# Patient Record
Sex: FEMALE | Race: WHITE | Employment: OTHER | URBAN - METROPOLITAN AREA
[De-identification: names, ages, dates, MRNs, and addresses within clinical notes are randomized per-mention and may not be internally consistent; named-entity substitution may affect disease eponyms.]

---

## 2018-10-03 ENCOUNTER — HOSPITAL ENCOUNTER (OUTPATIENT)
Dept: WOMENS IMAGING | Age: 58
Discharge: HOME | End: 2018-10-03
Payer: MEDICARE

## 2018-10-03 DIAGNOSIS — Z12.31 VISIT FOR SCREENING MAMMOGRAM: ICD-10-CM

## 2018-10-03 PROCEDURE — 77067 SCR MAMMO BI INCL CAD: CPT

## 2020-01-27 ENCOUNTER — HOSPITAL ENCOUNTER (OUTPATIENT)
Dept: WOMENS IMAGING | Age: 60
Discharge: HOME | End: 2020-01-27
Payer: COMMERCIAL

## 2020-01-27 PROCEDURE — 77067 SCR MAMMO BI INCL CAD: CPT

## 2021-03-16 ENCOUNTER — IMMUNIZATION (OUTPATIENT)
Dept: PRIMARY CARE CLINIC | Age: 61
End: 2021-03-16
Payer: MEDICARE

## 2021-03-16 PROCEDURE — 91301 COVID-19, MODERNA VACCINE 100MCG/0.5ML DOSE: CPT | Performed by: FAMILY MEDICINE

## 2021-03-16 PROCEDURE — 0011A PR IMM ADMN SARSCOV2 100 MCG/0.5 ML 1ST DOSE: CPT | Performed by: FAMILY MEDICINE

## 2021-08-19 ENCOUNTER — OFFICE VISIT (OUTPATIENT)
Dept: ORTHOPEDIC SURGERY | Age: 61
End: 2021-08-19
Payer: MEDICARE

## 2021-08-19 VITALS — HEIGHT: 58 IN | WEIGHT: 144 LBS | BODY MASS INDEX: 30.23 KG/M2

## 2021-08-19 DIAGNOSIS — M25.562 ACUTE PAIN OF BOTH KNEES: Primary | ICD-10-CM

## 2021-08-19 DIAGNOSIS — M65.9 SYNOVITIS OF BOTH KNEE JOINTS: ICD-10-CM

## 2021-08-19 DIAGNOSIS — M22.41 CHONDROMALACIA OF BOTH PATELLAE: ICD-10-CM

## 2021-08-19 DIAGNOSIS — M22.42 CHONDROMALACIA OF BOTH PATELLAE: ICD-10-CM

## 2021-08-19 DIAGNOSIS — M25.561 ACUTE PAIN OF BOTH KNEES: Primary | ICD-10-CM

## 2021-08-19 PROCEDURE — 20610 DRAIN/INJ JOINT/BURSA W/O US: CPT | Performed by: FAMILY MEDICINE

## 2021-08-19 PROCEDURE — G8417 CALC BMI ABV UP PARAM F/U: HCPCS | Performed by: FAMILY MEDICINE

## 2021-08-19 PROCEDURE — 99203 OFFICE O/P NEW LOW 30 MIN: CPT | Performed by: FAMILY MEDICINE

## 2021-08-19 PROCEDURE — G8427 DOCREV CUR MEDS BY ELIG CLIN: HCPCS | Performed by: FAMILY MEDICINE

## 2021-08-19 RX ORDER — BUPIVACAINE HYDROCHLORIDE 2.5 MG/ML
4 INJECTION, SOLUTION INFILTRATION; PERINEURAL ONCE
Status: COMPLETED | OUTPATIENT
Start: 2021-08-19 | End: 2021-08-19

## 2021-08-19 RX ORDER — CLOBETASOL PROPIONATE 0.5 MG/G
OINTMENT TOPICAL 2 TIMES DAILY PRN
COMMUNITY

## 2021-08-19 RX ORDER — LEVOTHYROXINE SODIUM 0.05 MG/1
TABLET ORAL
COMMUNITY
Start: 2020-09-15

## 2021-08-19 RX ORDER — TRAZODONE HYDROCHLORIDE 50 MG/1
TABLET ORAL
COMMUNITY
Start: 2021-06-14 | End: 2022-09-20 | Stop reason: ALTCHOICE

## 2021-08-19 RX ORDER — BETAMETHASONE SODIUM PHOSPHATE AND BETAMETHASONE ACETATE 3; 3 MG/ML; MG/ML
24 INJECTION, SUSPENSION INTRA-ARTICULAR; INTRALESIONAL; INTRAMUSCULAR; SOFT TISSUE ONCE
Status: COMPLETED | OUTPATIENT
Start: 2021-08-19 | End: 2021-08-19

## 2021-08-19 RX ORDER — HYDROXYZINE HYDROCHLORIDE 10 MG/1
10 TABLET, FILM COATED ORAL EVERY 4 HOURS PRN
COMMUNITY

## 2021-08-19 RX ORDER — TRAMADOL HYDROCHLORIDE 50 MG/1
TABLET ORAL
COMMUNITY
Start: 2021-06-22 | End: 2021-09-17

## 2021-08-19 RX ORDER — MIRTAZAPINE 15 MG/1
TABLET, FILM COATED ORAL
COMMUNITY
Start: 2021-06-14 | End: 2022-09-20 | Stop reason: ALTCHOICE

## 2021-08-19 RX ORDER — LORAZEPAM 1 MG/1
1 TABLET ORAL EVERY 6 HOURS PRN
COMMUNITY

## 2021-08-19 RX ORDER — LIDOCAINE HYDROCHLORIDE 10 MG/ML
2 INJECTION, SOLUTION INFILTRATION; PERINEURAL ONCE
Status: COMPLETED | OUTPATIENT
Start: 2021-08-19 | End: 2021-08-19

## 2021-08-19 RX ORDER — TOPIRAMATE 100 MG/1
TABLET, FILM COATED ORAL
COMMUNITY
Start: 2021-06-14

## 2021-08-19 RX ORDER — BUPROPION HYDROCHLORIDE 300 MG/1
300 TABLET ORAL EVERY MORNING
COMMUNITY
Start: 2021-08-13

## 2021-08-19 RX ORDER — SUMATRIPTAN 50 MG/1
50 TABLET, FILM COATED ORAL
COMMUNITY
Start: 2021-05-24 | End: 2022-09-20 | Stop reason: ALTCHOICE

## 2021-08-19 RX ORDER — CELECOXIB 200 MG/1
200 CAPSULE ORAL DAILY
Qty: 30 CAPSULE | Refills: 3 | Status: SHIPPED | OUTPATIENT
Start: 2021-08-19 | End: 2022-09-20 | Stop reason: ALTCHOICE

## 2021-08-19 RX ORDER — MELOXICAM 15 MG/1
15 TABLET ORAL DAILY
COMMUNITY
Start: 2021-07-14 | End: 2022-09-20 | Stop reason: ALTCHOICE

## 2021-08-19 RX ADMIN — BUPIVACAINE HYDROCHLORIDE 10 MG: 2.5 INJECTION, SOLUTION INFILTRATION; PERINEURAL at 16:12

## 2021-08-19 RX ADMIN — BETAMETHASONE SODIUM PHOSPHATE AND BETAMETHASONE ACETATE 24 MG: 3; 3 INJECTION, SUSPENSION INTRA-ARTICULAR; INTRALESIONAL; INTRAMUSCULAR; SOFT TISSUE at 16:11

## 2021-08-19 RX ADMIN — LIDOCAINE HYDROCHLORIDE 2 ML: 10 INJECTION, SOLUTION INFILTRATION; PERINEURAL at 16:12

## 2021-08-19 NOTE — PROGRESS NOTES
Chief Complaint  Knee Pain (N KNEE)      Initial consultation ongoing right greater than left anterior knee pain with mild swelling and history of multiple joint arthralgias followed by Dr. Kole Faye in rheumatology    History of Present Illness:  Edu Pandey is a 62 y.o. female who is a very pleasant white female recreational walker who is an avid  and is a very nice patient of Dr. Chacorta Stacy who is being seen today in kind consultation from Dr. Chacorta Stacy for evaluation of ongoing pain to the anterior portion of her knees bilaterally. She states that since summer 2020, she began to notice increasing pain with crepitation and popping to the anterior portion of her knees. There is no history of actual injury or new activity prior to becoming symptomatic and she is having pain ranging between a 2-5 out of 10 involving the knee. The right primarily is worse than the left but is not really associated with pseudobuckling and she has not noticed locking or catching or popping. At rest she will have a dull achy pain at 2 out of 10 but will have sharp for 4-5 out of 10 pain so she is squatting kneeling such as gardening and with reading and does have pain with positional changes and stairs. She was recently switched off Celebrex by Dr. Kole Faye to try meloxicam but believes the Celebrex was working much better for her. She is being seen today for orthopedic and sports consultation with initial weightbearing imaging.       Pain Assessment  Location of Pain: Knee  Location Modifiers: Left, Right  Severity of Pain: 5 (LEFT =5 RIGHT =3)  Quality of Pain: Sharp, Dull, Aching  Duration of Pain: Persistent  Frequency of Pain: Constant  Aggravating Factors: Standing, Walking, Other (Comment), Stairs (SIT TO STAND)  Limiting Behavior: Yes  Relieving Factors: Rest  Result of Injury: No  Work-Related Injury: No  Are there other pain locations you wish to document?: No         Medical History     Patient's medications, allergies, past medical, surgical, social and family histories were reviewed and updated as appropriate. Review of Systems  Pertinent items are noted in HPI  Review of systems reviewed from Patient History Form dated on 8/19/2021 and available in the patient's chart under the Media tab. Vital Signs  There were no vitals filed for this visit. General Exam:     Constitutional: Patient is adequately groomed with no evidence of malnutrition  DTRs: Deep tendon reflexes are intact  Mental Status: The patient is oriented to time, place and person. The patient's mood and affect are appropriate. Lymphatic: The lymphatic examination bilaterally reveals all areas to be without enlargement or induration. Vascular: Examination reveals no swelling or calf tenderness. Peripheral pulses are palpable and 2+. Neurological: The patient has good coordination. There is no weakness or sensory deficit. Knee Examination  Inspection: There is no high-grade deformity or malalignments. She is at best only trace knee joint effusions and does have some patellofemoral crepitation. Palpation: She.[email or web address] does have tenderness over the medial and lateral patellofemoral facet with patellar grind testing reproducing majority of her symptoms although she does have some very mild posterior medial joint line tenderness right greater than left. Rang of Motion: Full active and passive range of motion with mild tightness to hamstrings. Fair quad tone. Strength: Plus out of 5 with flexion extension. Special Tests: Positive patellar grind testing reproducing majority of her symptoms. No high-grade clicks and only mild worsening of pain with medial Lana's bilaterally. I do not sense high-grade instability and screening hip testing is benign. Skin: There are no rashes, ulcerations or lesions. Distal neurovascular exam is intact.     Gait: Fluid smooth gait    Reflex symmetrically preserved    Additional Comments: Additional Examinations:  Right Lower Extremity: Examination of the right lower extremity does not show any tenderness, deformity or injury. Range of motion is unremarkable. There is no gross instability. There are no rashes, ulcerations or lesions. Strength and tone are normal.  Left Lower Extremity: Examination of the left lower extremity does not show any tenderness, deformity or injury. Range of motion is unremarkable. There is no gross instability. There are no rashes, ulcerations or lesions. Strength and tone are normal.      Diagnostic Test Findings: Bilateral knee AP and PA weightbearing sunrise and lateral films does show relatively good maintenance of articular surface heights with mild tilt on sunrise view without high-grade patellofemoral arthropathy      Assessment : #1.  1 year status post symptomatic regular left knee symptomatic chondromalacia patella with knee synovitis and knee pain    Impression:  Encounter Diagnoses   Name Primary?  Acute pain of both knees Yes    Synovitis of both knee joints     Chondromalacia of both patellae        Office Procedures:  Orders Placed This Encounter   Procedures    XR KNEE RIGHT (MIN 4 VIEWS)     Standing Status:   Future     Number of Occurrences:   1     Standing Expiration Date:   8/19/2022     Order Specific Question:   Reason for exam:     Answer:   pain    XR KNEE LEFT (MIN 4 VIEWS)     Standing Status:   Future     Number of Occurrences:   1     Standing Expiration Date:   8/19/2022     Order Specific Question:   Reason for exam:     Answer:   pain    Ambulatory referral to Physical Therapy     Referral Priority:   Routine     Referral Type:   Eval and Treat     Referral Reason:   Specialty Services Required     Requested Specialty:   Physical Therapy     Number of Visits Requested:   1    20610 - [place] DRAIN/INJECT LARGE JOINT/BURSA       Treatment Plan:  Treatment options were discussed with Twyla Garcia.   We did review her plain films and exam findings. I suspect we are dealing primarily with unrehabilitated right greater than left knee chondromalacia patella with maltracking. She also sees rheumatology for multiple joint arthralgias. We will change her back to Celebrex 200 mg daily has the meloxicam which she has been on for the past month does not seem to be as effective as the Celebrex was previously. After discussing options, we did perform bilateral knee intra-articular steroid injections using 2 cc of Celestone, 2 cc of Marcaine, 1 cc of Xylocaine. I would like for her to attend physical therapy for VMO strengthening and hamstring flexibility. Potential for MRI imaging given her mild posterior medial knee pain more so on the right was discussed. We will hold off and see how she does with additional conservative treatment. Icing and activity modification also discussed. We will see her back in 4 weeks but she will contact us in the interim with questions or concerns. CC: Dr. Erin Porras      This dictation was performed with a verbal recognition program [facility]) and it was checked for errors. It is possible that there are still dictated errors within this office note. If so, please bring any errors to my attention for an addendum. All efforts were made to ensure that this office note is accurate.

## 2021-08-24 ENCOUNTER — HOSPITAL ENCOUNTER (OUTPATIENT)
Dept: PHYSICAL THERAPY | Age: 61
Setting detail: THERAPIES SERIES
Discharge: HOME | End: 2021-08-24
Payer: MEDICARE

## 2021-08-24 PROCEDURE — 97110 THERAPEUTIC EXERCISES: CPT | Performed by: PHYSICAL THERAPIST

## 2021-08-24 PROCEDURE — 97112 NEUROMUSCULAR REEDUCATION: CPT | Performed by: PHYSICAL THERAPIST

## 2021-08-24 PROCEDURE — 97161 PT EVAL LOW COMPLEX 20 MIN: CPT | Performed by: PHYSICAL THERAPIST

## 2021-08-24 NOTE — FLOWSHEET NOTE
Weight Shift     Ankle Pumps                    CKC     Calf raises     Wall sits     Step ups     1 leg stand 20 sec x 3 B  Added 8/24   Squatting     CC TKE     Balance     bridges          PRE     Extension  RANGE:   Flexion  RANGE:        Quantum machines     Leg press      Leg extension     Leg curl          Manual interventions                     Therapeutic Exercise and NMR EXR  [x] (99183) Provided verbal/tactile cueing for activities related to strengthening, flexibility, endurance, ROM for improvements in LE, proximal hip, and core control with self care, mobility, lifting, ambulation. [x] (00628) Provided verbal/tactile cueing for activities related to improving balance, coordination, kinesthetic sense, posture, motor skill, proprioception  to assist with LE, proximal hip, and core control in self care, mobility, lifting, ambulation and eccentric single leg control.      NMR and Therapeutic Activities:    [x] (38845 or 66773) Provided verbal/tactile cueing for activities related to improving balance, coordination, kinesthetic sense, posture, motor skill, proprioception and motor activation to allow for proper function of core, proximal hip and LE with self care and ADLs  [] (10895) Gait Re-education- Provided training and instruction to the patient for proper LE, core and proximal hip recruitment and positioning and eccentric body weight control with ambulation re-education including up and down stairs     Home Exercise Program:    [x] (30077) Reviewed/Progressed HEP activities related to strengthening, flexibility, endurance, ROM of core, proximal hip and LE for functional self-care, mobility, lifting and ambulation/stair navigation   [] (46735)Reviewed/Progressed HEP activities related to improving balance, coordination, kinesthetic sense, posture, motor skill, proprioception of core, proximal hip and LE for self care, mobility, lifting, and ambulation/stair navigation      Manual Treatments:  PROM / STM / Oscillations-Mobs:  G-I, II, III, IV (PA's, Inf., Post.)  [] (99189) Provided manual therapy to mobilize LE, proximal hip and/or LS spine soft tissue/joints for the purpose of modulating pain, promoting relaxation,  increasing ROM, reducing/eliminating soft tissue swelling/inflammation/restriction, improving soft tissue extensibility and allowing for proper ROM for normal function with self care, mobility, lifting and ambulation. Modalities:  Declined 8/24   [] GAME READY (VASO)- for significant edema, swelling, pain control. Charges:  Timed Code Treatment Minutes: 30'    Total Treatment Minutes: 54'       [x] EVAL (LOW) 455 1011 (typically 20 minutes face-to-face)  [] EVAL (MOD) 32635 (typically 30 minutes face-to-face)  [] EVAL (HIGH) 82172 (typically 45 minutes face-to-face)  [] RE-EVAL     [x] IK(76642) x 1    [] IONTO  [x] NMR (62886) x 1    [] VASO  [] Manual (51729) x     [] Other:  [] TA x      [] Mech Traction (41499)  [] ES(attended) (34566)      [] ES (un) (62965):       ASSESSMENT:  See eval 8/24    GOALS:  Patient stated goal: exercises for home to gain strength for knee  [] Progressing: [] Met: [] Not Met: [] Adjusted    Therapist goals for Patient:   Short Term Goals: To be achieved in: 2 weeks  1. Independent in HEP and progression per patient tolerance, in order to prevent re-injury. [] Progressing: [] Met: [] Not Met: [] Adjusted   2. Patient will have a decrease in pain to facilitate improvement in movement, function, and ADLs as indicated by Functional Deficits. [] Progressing: [] Met: [] Not Met: [] Adjusted    Long Term Goals: To be achieved in: 12 weeks  1. Disability index score of 26% or less for the [facility] to assist with reaching prior level of function. [] Progressing: [] Met: [] Not Met: [] Adjusted  2.  Patient will demonstrate increased bilateral knee flex AROM to greater than or equal to 130 deg to allow for proper joint functioning as indicated by patients Functional Deficits. [] Progressing: [] Met: [] Not Met: [] Adjusted  3. Patient will demonstrate an increase in bilateral hip (flex and ABD) and knee (flex and ext) strength to 4+/5 to allow for proper functional mobility as indicated by patients Functional Deficits. [] Progressing: [] Met: [] Not Met: [] Adjusted  4. Patient will return to sit to stands and ambulating stairs  without increased symptoms or restriction. [] Progressing: [] Met: [] Not Met: [] Adjusted  5. Patient will report gardening (if in season) pain free. [] Progressing: [] Met: [] Not Met: [] Adjusted     Overall Progression Towards Functional goals/ Treatment Progress Update:  [] Patient is progressing as expected towards functional goals listed. [] Progression is slowed due to complexities/Impairments listed. [] Progression has been slowed due to co-morbidities. [x] Plan just implemented, too soon to assess goals progression <30days   [] Goals require adjustment due to lack of progress  [] Patient is not progressing as expected and requires additional follow up with physician  [] Other    Prognosis for POC: [x] Good [] Fair  [] Poor      Patient requires continued skilled intervention: [x] Yes  [] No    Treatment/Activity Tolerance:  [x] Patient tolerated treatment well [] Patient limited by fatique  [] Patient limited by pain  [] Patient limited by other medical complications  [] Other: Pt is going to be out of town for about 8 weeks for the birth of her new grandchild. She will follow-up when she comes back into town. She was educated to email me with any questions/ concerns while she is out of town.  8/24    Return to Play: (if applicable)   []  Stage 1: Intro to Strength   []  Stage 2: Return to Run and Strength   []  Stage 3: Return to Jump and Strength   []  Stage 4: Dynamic Strength and Agility   []  Stage 5: Sport Specific Training     []  Ready to Return to Play, Meets All Above Stages   []  Not Ready for Return to Sports   Comments:            Patient education: Patient education on PT and plan of care including diagnosis, prognosis, treatment goals and options. Patient agrees with discussed POC and treatment and is aware of rehab process. 8/24      PLAN: See eval 8/24  [] Continue per plan of care [] Alter current plan (see comments above)  [x] Plan of care initiated [] Hold pending MD visit [] Discharge      Electronically signed by:  Ayad Soriano PT, DPT     Note: If patient does not return for scheduled/ recommended follow up visits, this note will serve as a discharge from care along with most recent update on progress.

## 2021-08-24 NOTE — PLAN OF CARE
of knee pain, but they started to get worse over the past year. She states she saw a rheumatologist, who referred her to an ortho MD for her knees. She states she is also seeing a chiropractor for her back right now, as well. She states the ortho MD, who gave her bilateral cortisone shots. She states they have really helped. She states the ortho MD also referred her to PT. CLOF: She states bending and going up/down steps increases the pain the worst. Denies pain with sleeping and driving. She states moving after prolonged sitting/ laying is difficult. She states her knees get \"caught\" and she has to walk funny to release.  She states left knee is worse than R knee     Relevant Medical History: RA (was tested and was negative, but re-testing to make sure it was not a false negative), OA, anxiety (controlled), depression (controlled), N&T (in hands), SOB (PCP is aware), vertigo (comes and goes), migraines (2x/ week, takes medication) smokes   Functional Disability Index:  WOMAC  8/24 - 52% limitation       Pain Scale: 0/10 today, 8/10 at worst  Easing factors: none  Provocative factors: listed above      Type: [x]Constant   []Intermittent  []Radiating []Localized []other:     Numbness/Tingling: none    Occupation/School: Retired    Hobbies: gardening, grandbabies    Living Status/Prior Level of Function: Independent with ADLs and IADLs    OBJECTIVE:      Flexibility L R Comment   Hamstring Lacking 33 deg Lacking 21 deg 90/90 position   ITB WFL WFL    Quad              ROM AROM    L R   Flexion 122 123   Extension 0 2 hyperext       Strength L R Comment   Quad 4-/5 4+/5    Hamstring 3+/5 4+/5          Hip  flexion 3+/5 + pain  4/5    Hip abd 4-/5 4/5    Hip ext                 Special Test Results/Comment   Meniscal Click Neg   Crepitus Pos   Valgus Laxity Neg   Varus Laxity Neg         Reflexes/Sensation:    [x]Dermatomes/Myotomes intact    []Reflexes equal and normal bilaterally   []Other:    Joint mobility: [x]Normal    []Hypo   []Hyper    Palpation: no TTP     Functional Mobility/Transfers: independent     Posture: WFL     Gait: (include devices/WB status) lacking L knee ext at initial contact, toe out on LLE                         [x] Patient history, allergies, meds reviewed. Medical chart reviewed. See intake form. Review Of Systems (ROS):  [x]Performed Review of systems (Integumentary, CardioPulmonary, Neurological) by intake and observation. Intake form has been scanned into medical record. Patient has been instructed to contact their primary care physician regarding ROS issues if not already being addressed at this time.       Co-morbidities/Complexities (which will affect course of rehabilitation):   []None           Arthritic conditions   []Rheumatoid arthritis (M05.9)  [x]Osteoarthritis (M19.91)   Cardiovascular conditions   []Hypertension (I10)  []Hyperlipidemia (E78.5)  []Angina pectoris (I20)  []Atherosclerosis (I70)   Musculoskeletal conditions   []Disc pathology   []Congenital spine pathologies   []Prior surgical intervention  []Osteoporosis (M81.8)  []Osteopenia (M85.8)   Endocrine conditions   []Hypothyroid (E03.9)  []Hyperthyroid Gastrointestinal conditions   []Constipation (H84.65)   Metabolic conditions   []Morbid obesity (E66.01)  []Diabetes type 1(E10.65) or 2 (E11.65)   []Neuropathy (G60.9)     Pulmonary conditions   []Asthma (J45)  []Coughing   []COPD (J44.9)   Psychological Disorders  [x]Anxiety (F41.9)  [x]Depression (F32.9)   []Other:   []Other:  Low back pain, present tobacco use        Barriers to/and or personal factors that will affect rehab potential:              []Age  []Sex              []Motivation/Lack of Motivation                        [x]Co-Morbidities              []Cognitive Function, education/learning barriers              []Environmental, home barriers              []profession/work barriers  [x]past PT/medical experience  []other:  Justification: Pt's co-morbidities and current c/o of back pain may affect rehab potential.     Falls Risk Assessment (30 days):   [x] Falls Risk assessed and no intervention required. [] Falls Risk assessed and Patient requires intervention due to being higher risk   TUG score (>12s at risk):     [] Falls education provided, including       ASSESSMENT:   Functional Impairments:     []Noted lumbar/proximal hip/LE hypomobility   [x]Decreased LE functional ROM   [x]Decreased core/proximal hip strength and neuromuscular control   [x]Decreased LE functional strength   []Reduced balance/proprioceptive control   []other:      Functional Activity Limitations (from functional questionnaire and intake)   [x]Reduced ability to tolerate prolonged functional positions   [x]Reduced ability or difficulty with changes of positions or transfers between positions   []Reduced ability to maintain good posture and demonstrate good body mechanics with sitting, bending, and lifting   [x]Reduced ability to sleep   [] Reduced ability or tolerance with driving and/or computer work   []Reduced ability to perform lifting, carrying tasks   [x]Reduced ability to squat   []Reduced ability to forward bend   [x]Reduced ability to ambulate prolonged functional periods/distances/surfaces   [x]Reduced ability to ascend/descend stairs   []Reduced ability to run, hop or jump   []other:     Participation Restrictions   []Reduced participation in self care activities   []Reduced participation in home management activities   [x]Reduced participation in work activities   [x]Reduced participation in social activities. []Reduced participation in sport activities. Classification :    []Signs/symptoms consistent with post-surgical status including decreased ROM, strength and function.    []Signs/symptoms consistent with joint sprain/strain   [x]Signs/symptoms consistent with patella-femoral syndrome   [x]Signs/symptoms consistent with knee OA/hip OA   []Signs/symptoms consistent with [x]  NMR activation and proprioception for LE, Glutes and Core   [x]  Manual therapy as indicated for LE, Hip and spine to include: Dry Needling/IASTM, STM, PROM, Gr I-IV mobilizations, manipulation. [x] Modalities as needed that may include: thermal agents, E-stim, Biofeedback, US, iontophoresis as indicated  [x] Patient education on joint protection, postural re-education, activity modification, progression of HEP. HEP instruction: (see scanned forms)    GOALS:  Patient stated goal: exercises for home to gain strength for knee  [] Progressing: [] Met: [] Not Met: [] Adjusted    Therapist goals for Patient:   Short Term Goals: To be achieved in: 2 weeks  1. Independent in HEP and progression per patient tolerance, in order to prevent re-injury. [] Progressing: [] Met: [] Not Met: [] Adjusted   2. Patient will have a decrease in pain to facilitate improvement in movement, function, and ADLs as indicated by Functional Deficits. [] Progressing: [] Met: [] Not Met: [] Adjusted    Long Term Goals: To be achieved in: 12 weeks  1. Disability index score of 26% or less for the [facility] to assist with reaching prior level of function. [] Progressing: [] Met: [] Not Met: [] Adjusted  2. Patient will demonstrate increased bilateral knee flex AROM to greater than or equal to 130 deg to allow for proper joint functioning as indicated by patients Functional Deficits. [] Progressing: [] Met: [] Not Met: [] Adjusted  3. Patient will demonstrate an increase in bilateral hip (flex and ABD) and knee (flex and ext) strength to 4+/5 to allow for proper functional mobility as indicated by patients Functional Deficits. [] Progressing: [] Met: [] Not Met: [] Adjusted  4. Patient will return to sit to stands and ambulating stairs  without increased symptoms or restriction. [] Progressing: [] Met: [] Not Met: [] Adjusted  5. Patient will report gardening (if in season) pain free.    [] Progressing: [] Met: [] Not Met: [] Adjusted     Electronically signed by:  Roland Singh, PT, DPT

## 2021-10-15 ENCOUNTER — CLINICAL DOCUMENTATION (OUTPATIENT)
Dept: OTHER | Age: 61
End: 2021-10-15

## 2022-01-20 ENCOUNTER — TELEPHONE (OUTPATIENT)
Dept: ORTHOPEDIC SURGERY | Age: 62
End: 2022-01-20

## 2022-01-20 NOTE — TELEPHONE ENCOUNTER
Spoke with patient in regards to the [place] [id number] outreach joint pain program for the knee. Patient is established with Dr. Emigdio Mckeon for her knee pain and isn't having any issues at this time. She can give us a call in the future if there is anything further that we can do for her.

## 2022-05-25 ENCOUNTER — OFFICE VISIT (OUTPATIENT)
Dept: ORTHOPEDIC SURGERY | Age: 62
End: 2022-05-25
Payer: MEDICARE

## 2022-05-25 VITALS — HEIGHT: 58 IN | WEIGHT: 140 LBS | BODY MASS INDEX: 29.39 KG/M2

## 2022-05-25 DIAGNOSIS — M43.17 ACQUIRED SPONDYLOLISTHESIS OF LUMBOSACRAL REGION: ICD-10-CM

## 2022-05-25 DIAGNOSIS — M48.07 FORAMINAL STENOSIS OF LUMBOSACRAL REGION: ICD-10-CM

## 2022-05-25 DIAGNOSIS — M54.50 LOW BACK PAIN, UNSPECIFIED BACK PAIN LATERALITY, UNSPECIFIED CHRONICITY, UNSPECIFIED WHETHER SCIATICA PRESENT: Primary | ICD-10-CM

## 2022-05-25 DIAGNOSIS — M47.816 ARTHROPATHY OF LUMBAR FACET JOINT: ICD-10-CM

## 2022-05-25 PROCEDURE — G8427 DOCREV CUR MEDS BY ELIG CLIN: HCPCS | Performed by: PHYSICIAN ASSISTANT

## 2022-05-25 PROCEDURE — G8417 CALC BMI ABV UP PARAM F/U: HCPCS | Performed by: PHYSICIAN ASSISTANT

## 2022-05-25 PROCEDURE — 99214 OFFICE O/P EST MOD 30 MIN: CPT | Performed by: PHYSICIAN ASSISTANT

## 2022-05-25 PROCEDURE — 4004F PT TOBACCO SCREEN RCVD TLK: CPT | Performed by: PHYSICIAN ASSISTANT

## 2022-05-25 PROCEDURE — 3017F COLORECTAL CA SCREEN DOC REV: CPT | Performed by: PHYSICIAN ASSISTANT

## 2022-05-25 NOTE — PROGRESS NOTES
History of present illness:   Ms. Ilan Morgan is a pleasant 65 y.o. female kindly referred by Melissa Musa MD for consultation regarding her LBP and bilateral left greater than right leg pain. She states her pain began years ago. Her pain has steadily worsened since onset. She rates her back pain 7/10 VAS, buttock pain 6/10 VAS and leg pain 6/10 VAS. She describes the pain as aching, throbbing discomfort. The leg pain radiates down the posterior lateral aspect of her leg to her lateral lower legs and feet. She reports intermittent numbness and tingling into her lower legs and feet. She reports feeling fatigued and weakness of her lower legs with prolonged standing and activity. She denies bowel or bladder dysfunction and saddle anesthesia. She states she can sit for a maximum of 30-60 minutes and stand for a maximum 30-60 minutes. The pain does disrupts her sleep. She has tried physical therapy, chiropractic therapy and manipulations within the past year to year and a half without significant relief. She is currently using ice, ibuprofen and heat without relief. She had to discontinue ibuprofen recently because of adverse renal effects. Past medical history:  Her past medical history has been reviewed. History reviewed. No pertinent past medical history. Her past surgical history has been reviewed. History reviewed. No pertinent surgical history. Her medications and allergies were reviewed.   Current Outpatient Medications   Medication Sig Dispense Refill    buPROPion (WELLBUTRIN XL) 300 MG extended release tablet       clobetasol (TEMOVATE) 0.05 % ointment clobetasol 0.05 % topical ointment   APPLY A THIN LAYER  BID TO THE AFFECTED AREA(S) OF THE LEGS      hydrOXYzine (ATARAX) 10 MG tablet hydroxyzine HCl 10 mg tablet      levothyroxine (SYNTHROID) 50 MCG tablet TAKE ONE TABLET BY MOUTH DAILY      LORazepam (ATIVAN) 1 MG tablet lorazepam 1 mg tablet      meloxicam (MOBIC) 15 MG tablet Take 15 mg by mouth daily      mirtazapine (REMERON) 15 MG tablet TAKE ONE TABLET BY MOUTH AT BEDTIME      SUMAtriptan (IMITREX) 50 MG tablet Take 50 mg by mouth every 2 hours as needed      topiramate (TOPAMAX) 100 MG tablet TAKE ONE TABLET BY MOUTH TWICE A DAY      traZODone (DESYREL) 50 MG tablet TAKE ONE TABLET BY MOUTH AT BEDTIME      celecoxib (CELEBREX) 200 MG capsule Take 1 capsule by mouth daily 30 capsule 3     No current facility-administered medications for this visit. Her social history has been reviewed. Social History     Occupational History    Not on file   Tobacco Use    Smoking status: Current Every Day Smoker    Smokeless tobacco: Never Used   Substance and Sexual Activity    Alcohol use: Not on file    Drug use: Not on file    Sexual activity: Not on file         Her family history has been reviewed. History reviewed. No pertinent family history. Review of Systems:  I have reviewed the clinically relevant past medical history, medications, allergies, family history, social history, and 13 point Review of Systems from the patient's recent history form & documented any details relevant to today's presenting complaints in the history above. The patient's self-reported past medical history, medications, allergies, family history, social history, and Review of Systems as well as a spine form from today's date have been scanned into the chart under the \"Media\" tab. Patient's review of symptoms was reviewed and is significant for back pain and negative for recent weight loss, fatigue, chills, visual disturbances, blood in stool or urine, recent infection. Physical examination:  Ms. Edel Garcia's most recent vitals:  Vitals  Height: 4' 10\" (147.3 cm)  Weight: 140 lb (63.5 kg)  Body mass index is 29.26 kg/m².     General exam:  She is well-developed and well-nourished, is in obvious discomfort and alert and oriented to person, place, and time. She demonstrates appropriate mood and affect. Her skin is warm and dry. Her gait is normal and she walks heel to toe without significant limp or instability. Back:  She stands with slight lumbar flexion. Her lumbar flexion, extension and lateral bending are moderately reduced with pain. She has mild tenderness over her lumbar spine without obvious muscle spasm. The skin over her lumbar spine is normal without a surgical scar. Lower extremities:  She has 5/5 motor strength of bilateral lower extremities. She has a negative straight leg raise, bilaterally. Deep tendon reflexes at knees and achilles are 2+. Sensation is intact to light touch L3 to S1 bilaterally. She has no clonus. Hip range of motion is pain-free. Stinchfield test is negative. Abdomen:  Non-tender and non-distended. No rash. Imaging:  X rays AP and lateral lumbar spine obtained in the office today. Grade 1 spondylolisthesis at the L5-S1 level. Lower lumbar facet arthritis. Mild degenerative disc disease. Diagnosis:      ICD-10-CM    1. Low back pain, unspecified back pain laterality, unspecified chronicity, unspecified whether sciatica present  M54.50 XR LUMBAR SPINE (2-3 VIEWS)   2. Acquired spondylolisthesis of lumbosacral region  M43.17 MRI LUMBAR SPINE WO CONTRAST   3. Arthropathy of lumbar facet joint  M47.816 MRI LUMBAR SPINE WO CONTRAST   4. Foraminal stenosis of lumbosacral region  M48.07 MRI LUMBAR SPINE WO CONTRAST          Assessment/ Plan:  Chronic lower back pain with what appears to be neurogenic claudication due to a combination of lumbar spondylolisthesis L5-S1, degenerative disc disease and probable some degree of foraminal stenosis. She does remain neurologically intact in both lower extremities. I had an extensive discussion with Ms. Twyla Garcia and/or family regarding the natural history, etiology, and long term consequences of her condition.    I have presented reasonable alternatives to the patient's proposed care, treatment, and services. Risks and benefits of the treatment options also reviewed in detail. I have outlined a treatment plan with them. She has had full opportunity to ask her questions. I have answered them all to her satisfaction. I feel that Ms. Twyla Garcia understands our discussion today     New Medications prescribed today-she cannot take NSAIDs due to adverse effects on kidney function. Tylenol has been ineffective. PT-she has been in therapy within the past year without relief. Prior to that she was doing chiropractic therapy and manipulations with mild very temporary relief. She is currently doing a physical therapist instructed home exercise program.    Further Imaging-MRI lumbar spine to assess for foraminal stenosis. Procedures-I do think she would do well with epidural steroid injections, medial branch blocks and possible ablation. Follow [id number] after MRI to discuss results and further treatment options. She was instructed to call us emergently if she begins to experience bowel or bladder dysfunction, saddle anesthesia, increasing muscle weakness, or onset/ worsening leg symptoms. Jessica Amado PA-C   Senior Physician Assistant   [facility]                                         Disclaimer: This note was generated with use of a verbal recognition program (DRAGON) and an attempt was made to check for errors. It is possible that there are still dictated errors within this office note. If so, please bring any significant errors to my attention for an addendum. All efforts were made to ensure that this office note is accurate.

## 2022-06-06 ENCOUNTER — HOSPITAL ENCOUNTER (OUTPATIENT)
Dept: MRI IMAGING | Age: 62
Discharge: HOME | End: 2022-06-06
Payer: MEDICARE

## 2022-06-06 DIAGNOSIS — M47.816 ARTHROPATHY OF LUMBAR FACET JOINT: ICD-10-CM

## 2022-06-06 DIAGNOSIS — M43.17 ACQUIRED SPONDYLOLISTHESIS OF LUMBOSACRAL REGION: ICD-10-CM

## 2022-06-06 DIAGNOSIS — M48.07 FORAMINAL STENOSIS OF LUMBOSACRAL REGION: ICD-10-CM

## 2022-06-06 PROCEDURE — 72148 MRI LUMBAR SPINE W/O DYE: CPT

## 2022-06-15 ENCOUNTER — OFFICE VISIT (OUTPATIENT)
Dept: ORTHOPEDIC SURGERY | Age: 62
End: 2022-06-15
Payer: MEDICARE

## 2022-06-15 VITALS — HEIGHT: 59 IN | WEIGHT: 139.2 LBS | BODY MASS INDEX: 28.06 KG/M2 | RESPIRATION RATE: 18 BRPM

## 2022-06-15 DIAGNOSIS — G62.9 NEUROPATHY: ICD-10-CM

## 2022-06-15 DIAGNOSIS — M48.07 FORAMINAL STENOSIS OF LUMBOSACRAL REGION: Primary | ICD-10-CM

## 2022-06-15 PROCEDURE — G8427 DOCREV CUR MEDS BY ELIG CLIN: HCPCS | Performed by: PHYSICIAN ASSISTANT

## 2022-06-15 PROCEDURE — 4004F PT TOBACCO SCREEN RCVD TLK: CPT | Performed by: PHYSICIAN ASSISTANT

## 2022-06-15 PROCEDURE — 99213 OFFICE O/P EST LOW 20 MIN: CPT | Performed by: PHYSICIAN ASSISTANT

## 2022-06-15 PROCEDURE — G8417 CALC BMI ABV UP PARAM F/U: HCPCS | Performed by: PHYSICIAN ASSISTANT

## 2022-06-15 PROCEDURE — 3017F COLORECTAL CA SCREEN DOC REV: CPT | Performed by: PHYSICIAN ASSISTANT

## 2022-06-16 PROBLEM — G62.9 NEUROPATHY: Status: ACTIVE | Noted: 2022-06-16

## 2022-06-16 NOTE — PROGRESS NOTES
Subjective:      Patient ID: Arabella Cordon is a 65 y.o. female who is here for follow up evaluation of back pain and left greater than right leg pain. Recently completed MRI of the lumbar spine and is here today to review this test results. She has also noted increased discomfort in both lower extremities especially at night. She describes burning sensation in both feet. She was recently prescribed medication for neuropathy. She has not had an EMG. Review Of Systems:   Negative for bladder or bowel changes. Negative for visual disturbances, recent infection, recent weight loss. She denies saddle anesthesia. History reviewed. No pertinent past medical history. History reviewed. No pertinent family history. History reviewed. No pertinent surgical history.     Social History     Occupational History    Not on file   Tobacco Use    Smoking status: Current Every Day Smoker    Smokeless tobacco: Never Used   Substance and Sexual Activity    Alcohol use: Not on file    Drug use: Not on file    Sexual activity: Not on file       Current Outpatient Medications   Medication Sig Dispense Refill    buPROPion (WELLBUTRIN XL) 300 MG extended release tablet       clobetasol (TEMOVATE) 0.05 % ointment clobetasol 0.05 % topical ointment   APPLY A THIN LAYER  BID TO THE AFFECTED AREA(S) OF THE LEGS      hydrOXYzine (ATARAX) 10 MG tablet hydroxyzine HCl 10 mg tablet      levothyroxine (SYNTHROID) 50 MCG tablet TAKE ONE TABLET BY MOUTH DAILY      LORazepam (ATIVAN) 1 MG tablet lorazepam 1 mg tablet      meloxicam (MOBIC) 15 MG tablet Take 15 mg by mouth daily      mirtazapine (REMERON) 15 MG tablet TAKE ONE TABLET BY MOUTH AT BEDTIME      SUMAtriptan (IMITREX) 50 MG tablet Take 50 mg by mouth every 2 hours as needed      topiramate (TOPAMAX) 100 MG tablet TAKE ONE TABLET BY MOUTH TWICE A DAY      traZODone (DESYREL) 50 MG tablet TAKE ONE TABLET BY MOUTH AT BEDTIME      celecoxib (CELEBREX) 200 MG capsule Take 1 capsule by mouth daily 30 capsule 3     No current facility-administered medications for this visit. Objective:     She is alert, oriented x 3, pleasant, well nourished, developed and in no   acute distress. Resp 18   Ht 4' 11\" (1.499 m)   Wt 139 lb 3.2 oz (63.1 kg)   BMI 28.11 kg/m²      General exam:  She is well-developed and well-nourished, is in obvious discomfort and alert and oriented to person, place, and time. She demonstrates appropriate mood and affect. Her skin is warm and dry. Her gait is normal and she walks heel to toe without significant limp or instability. Back:  She stands with slight lumbar flexion. Her lumbar flexion, extension and lateral bending are moderately reduced with pain. She has mild tenderness over her lumbar spine without obvious muscle spasm. The skin over her lumbar spine is normal without a surgical scar. Lower extremities:  She has 5/5 motor strength of bilateral lower extremities. She has a negative straight leg raise, bilaterally. Deep tendon reflexes at knees and achilles are 2+. Sensation is grossly intact to light touch L3 to S1 bilaterally. She has no clonus. X Rays: not performed in the office today:   MRI: Obtained from [facility] or an outside facility. EXAMINATION:   MRI OF THE LUMBAR SPINE WITHOUT CONTRAST, 6/6/2022 7:05 am       TECHNIQUE:   Multiplanar multisequence MRI of the lumbar spine was performed without the   administration of intravenous contrast.       COMPARISON:   Lumbar radiographs 05/25/2022       HISTORY:   ORDERING SYSTEM PROVIDED HISTORY: Acquired spondylolisthesis of lumbosacral   region       FINDINGS:   BONES/ALIGNMENT: There is grade 1 anterolisthesis of L4 on 5.  The vertebral   body heights are maintained. The bone marrow signal appears unremarkable.    Transitional lumbosacral anatomy with sacralization of L5.       SPINAL CORD: Terminates at the L1 level, within normal limits.  No evidence   of intradural mass.       SOFT TISSUES: No paraspinal mass identified.       L1-L2: There is no significant disc herniation, spinal canal stenosis or   neural foraminal narrowing.       L2-L3: There is no significant disc herniation, spinal canal stenosis or   neural foraminal narrowing.       L3-L4: Mild disc bulge and facet arthropathy without significant canal   stenosis.  Mild bilateral foraminal stenosis.       L4-L5: Disc bulging facet arthropathy without significant canal stenosis. Mild to moderate right foraminal stenosis.       L5-S1: There is no significant disc herniation, spinal canal stenosis or   neural foraminal narrowing.           Impression   Grade 1 anterolisthesis of L4 on 5.       Transitional lumbosacral anatomy with sacralization of L5.       No significant canal stenosis.             Diagnosis:       ICD-10-CM    1. Foraminal stenosis of lumbosacral region  M48.07 MEHUL - Pina Jackson MD, Pain Management, [facility]   2. Neuropathy  G62.9 EMG        Assessment and Plan:       Assessment:  Low back pain probably due to grade 1 anterolisthesis L4-5. Bilateral leg radicular pain due to foraminal stenosis of various degrees at L3-4 and L4-5. Bilateral lower leg symptoms may be contributed to neuropathy. I had an extensive discussion with Ms. Twyla Garcia regarding the natural history, etiology, and long term consequences of her condition. I have presented reasonable alternatives to the patient's proposed care, treatment, and services. Risks and benefits of the treatment options also reviewed in detail. I have outlined a treatment plan with them. She has had full opportunity to ask her questions. I have answered them all to her satisfaction. I feel that Ms. Genesis Garcia understands our discussion today. Plan:  Medications-no new medications today. Further Imaging-EMG bilateral lower extremities to evaluate for neuropathy.     Procedures-   At this time, I do not believe spinal surgery is indicated. She may benefit other therapeutic options such as epidural steroid injection, facet injection or other interventional procedures. For this reason, I am going to refer to Dr. Chidi Grant for Interventional Pain Management for an evaluation and treatment. Follow up-she may call after EMG to discuss EMG results and treatment options. Call or return to clinic if these symptoms worsen or fail to improve as anticipated. Mable Boland PA-C   Senior Physician Assistant   [facility]                                         Disclaimer: This note was generated with use of a verbal recognition program (DRAGON) and an attempt was made to check for errors. It is possible that there are still dictated errors within this office note. If so, please bring any significant errors to my attention for an addendum. All efforts were made to ensure that this office note is accurate.

## 2022-06-20 ENCOUNTER — HOSPITAL ENCOUNTER (OUTPATIENT)
Age: 62
Discharge: HOME | End: 2022-06-20
Payer: MEDICARE

## 2022-06-20 ENCOUNTER — HOSPITAL ENCOUNTER (OUTPATIENT)
Dept: GENERAL RADIOLOGY | Age: 62
Discharge: HOME | End: 2022-06-20
Payer: MEDICARE

## 2022-06-20 DIAGNOSIS — M54.16 RADICULOPATHY OF LUMBAR REGION: ICD-10-CM

## 2022-06-20 DIAGNOSIS — M54.12 CERVICAL RADICULOPATHY: ICD-10-CM

## 2022-06-20 PROCEDURE — 72110 X-RAY EXAM L-2 SPINE 4/>VWS: CPT

## 2022-06-20 PROCEDURE — 72050 X-RAY EXAM NECK SPINE 4/5VWS: CPT

## 2022-06-28 ENCOUNTER — HOSPITAL ENCOUNTER (OUTPATIENT)
Dept: WOMENS IMAGING | Age: 62
Discharge: HOME | End: 2022-06-28
Payer: MEDICARE

## 2022-06-28 DIAGNOSIS — Z12.31 BREAST CANCER SCREENING BY MAMMOGRAM: ICD-10-CM

## 2022-06-28 PROCEDURE — 77067 SCR MAMMO BI INCL CAD: CPT

## 2022-06-30 ENCOUNTER — OFFICE VISIT (OUTPATIENT)
Dept: ORTHOPEDIC SURGERY | Age: 62
End: 2022-06-30
Payer: MEDICARE

## 2022-06-30 VITALS — WEIGHT: 139 LBS | HEIGHT: 58 IN | BODY MASS INDEX: 29.18 KG/M2

## 2022-06-30 DIAGNOSIS — M65.9 SYNOVITIS OF BOTH KNEE JOINTS: ICD-10-CM

## 2022-06-30 DIAGNOSIS — M25.561 ACUTE PAIN OF BOTH KNEES: ICD-10-CM

## 2022-06-30 DIAGNOSIS — M22.41 CHONDROMALACIA OF BOTH PATELLAE: Primary | ICD-10-CM

## 2022-06-30 DIAGNOSIS — M22.42 CHONDROMALACIA OF BOTH PATELLAE: Primary | ICD-10-CM

## 2022-06-30 DIAGNOSIS — M25.562 ACUTE PAIN OF BOTH KNEES: ICD-10-CM

## 2022-06-30 PROBLEM — M65.961 SYNOVITIS OF BOTH KNEE JOINTS: Status: ACTIVE | Noted: 2022-06-30

## 2022-06-30 PROBLEM — M65.962 SYNOVITIS OF BOTH KNEE JOINTS: Status: ACTIVE | Noted: 2022-06-30

## 2022-06-30 PROCEDURE — G8427 DOCREV CUR MEDS BY ELIG CLIN: HCPCS | Performed by: FAMILY MEDICINE

## 2022-06-30 PROCEDURE — 3017F COLORECTAL CA SCREEN DOC REV: CPT | Performed by: FAMILY MEDICINE

## 2022-06-30 PROCEDURE — 4004F PT TOBACCO SCREEN RCVD TLK: CPT | Performed by: FAMILY MEDICINE

## 2022-06-30 PROCEDURE — G8417 CALC BMI ABV UP PARAM F/U: HCPCS | Performed by: FAMILY MEDICINE

## 2022-06-30 PROCEDURE — 20610 DRAIN/INJ JOINT/BURSA W/O US: CPT | Performed by: FAMILY MEDICINE

## 2022-06-30 PROCEDURE — 99214 OFFICE O/P EST MOD 30 MIN: CPT | Performed by: FAMILY MEDICINE

## 2022-06-30 RX ORDER — BETAMETHASONE SODIUM PHOSPHATE AND BETAMETHASONE ACETATE 3; 3 MG/ML; MG/ML
24 INJECTION, SUSPENSION INTRA-ARTICULAR; INTRALESIONAL; INTRAMUSCULAR; SOFT TISSUE ONCE
Status: COMPLETED | OUTPATIENT
Start: 2022-06-30 | End: 2022-06-30

## 2022-06-30 RX ORDER — LIDOCAINE HYDROCHLORIDE 10 MG/ML
2 INJECTION, SOLUTION INFILTRATION; PERINEURAL ONCE
Status: COMPLETED | OUTPATIENT
Start: 2022-06-30 | End: 2022-06-30

## 2022-06-30 RX ORDER — BUPIVACAINE HYDROCHLORIDE 2.5 MG/ML
4 INJECTION, SOLUTION INFILTRATION; PERINEURAL ONCE
Status: COMPLETED | OUTPATIENT
Start: 2022-06-30 | End: 2022-06-30

## 2022-06-30 RX ADMIN — BETAMETHASONE SODIUM PHOSPHATE AND BETAMETHASONE ACETATE 24 MG: 3; 3 INJECTION, SUSPENSION INTRA-ARTICULAR; INTRALESIONAL; INTRAMUSCULAR; SOFT TISSUE at 10:36

## 2022-06-30 RX ADMIN — LIDOCAINE HYDROCHLORIDE 2 ML: 10 INJECTION, SOLUTION INFILTRATION; PERINEURAL at 10:37

## 2022-06-30 RX ADMIN — BUPIVACAINE HYDROCHLORIDE 10 MG: 2.5 INJECTION, SOLUTION INFILTRATION; PERINEURAL at 10:37

## 2022-06-30 NOTE — PROGRESS NOTES
Chief Complaint  Knee Pain (Bilateral Knee Pain returned 1 month ago)      Evaluation recurrent right greater than left anterior knee pain with mild swelling and history of multiple joint arthralgias followed by Dr. Reva Mcdonald in rheumatology    History of Present Illness:  Jovan Montes is a 65 y.o. female who is a very pleasant white female recreational walker who is an avid  and is a very nice patient of Dr. Abbey Ca who is being seen today in kind consultation from Dr. Abbey Ca for evaluation of ongoing pain to the anterior portion of her knees bilaterally. She states that since summer 2020, she began to notice increasing pain with crepitation and popping to the anterior portion of her knees. There is no history of actual injury or new activity prior to becoming symptomatic and she is having pain ranging between a 2-5 out of 10 involving the knee. The right primarily is worse than the left but is not really associated with pseudobuckling and she has not noticed locking or catching or popping. At rest she will have a dull achy pain at 2 out of 10 but will have sharp for 4-5 out of 10 pain so she is squatting kneeling such as gardening and with reading and does have pain with positional changes and stairs. She was recently switched off Celebrex by Dr. Reva Mcdonlad to try meloxicam but believes the Celebrex was working much better for her. She is being seen today for orthopedic and sports consultation with initial weightbearing imaging. [facility] was initially evaluated in the office for her knees on 8/19/2021 was found to have primarily symptomatic patellofemoral arthropathy with lower grades of weightbearing osteoarthritis. With initial treatment she did not standing up until the end of May 2022 when she began noticed worsening pain once again to her knees right equal to left.   There is no interim history of injury or no activity although she has been battling problems with disc herniations and radiculopathy and believes she has been altering her gait which may be the cause of her recurrent symptoms. She has a hard time performing her patella protection program as it may irritate her back and she is going to be receiving an epidural with Dr. Devin Johnson on 7/15/2022. She apparently did have some elevations in her renal functions and had to stop the Celebrex but apparently her renal functions have stabilized and has been using primarily Biofreeze. Denies locking catching or true instability symptoms with most of her pain being anterior in nature and difficulties been noted with distance walking positional changes going up and down stairs. Occasional night pain noted. No active locking or catching. Medical History     Patient's medications, allergies, past medical, surgical, social and family histories were reviewed and updated as appropriate. Review of Systems  Pertinent items are noted in HPI  Review of systems reviewed from Patient History Form dated on 8/19/2021 and available in the patient's chart under the Media tab. Vital Signs  There were no vitals filed for this visit. General Exam:     Constitutional: Patient is adequately groomed with no evidence of malnutrition  DTRs: Deep tendon reflexes are intact  Mental Status: The patient is oriented to time, place and person. The patient's mood and affect are appropriate. Lymphatic: The lymphatic examination bilaterally reveals all areas to be without enlargement or induration. Vascular: Examination reveals no swelling or calf tenderness. Peripheral pulses are palpable and 2+. Neurological: The patient has good coordination. There is no weakness or sensory deficit. Knee Examination  Inspection: There is no high-grade deformity or malalignments. She is at best only trace knee joint effusions and does have some patellofemoral crepitation.     Palpation: She does have tenderness over the medial and lateral patellofemoral facet with patellar grind testing reproducing majority of her symptoms although she does have some very mild posterior medial joint line tenderness right greater than left. Rang of Motion: Full active and passive range of motion with mild tightness to hamstrings. Fair quad tone. Strength: Plus out of 5 with flexion extension. Special Tests: Recurrent positive patellar grind testing reproducing majority of her symptoms. No high-grade clicks and only mild worsening of pain with medial Lana's bilaterally. I do not sense high-grade instability and screening hip testing is benign. Skin: There are no rashes, ulcerations or lesions. Distal neurovascular exam is intact. Gait: Fluid smooth gait    Reflex symmetrically preserved    Additional Comments:     Additional Examinations:  Right Lower Extremity: Examination of the right lower extremity does not show any tenderness, deformity or injury. Range of motion is unremarkable. There is no gross instability. There are no rashes, ulcerations or lesions. Strength and tone are normal.  Left Lower Extremity: Examination of the left lower extremity does not show any tenderness, deformity or injury. Range of motion is unremarkable. There is no gross instability. There are no rashes, ulcerations or lesions. Strength and tone are normal.      Diagnostic Test Findings: Bilateral knee AP and PA weightbearing sunrise and lateral films obtained today does show relatively good maintenance of articular surface heights with mild tilt on sunrise view without high-grade patellofemoral arthropathy      Assessment : #1.  4 Weeks status post recurrent  symptomatic bilateral knee symptomatic chondromalacia patella with recurrent knee synovitis and knee pain    Impression:  Encounter Diagnoses   Name Primary?     Chondromalacia of both patellae Yes    Acute pain of both knees     Synovitis of both knee joints        Office Procedures:  Orders Placed This Encounter Procedures    XR KNEE RIGHT (MIN 4 VIEWS)     4V R Knee     Standing Status:   Future     Number of Occurrences:   1     Standing Expiration Date:   7/30/2022     Order Specific Question:   Reason for exam:     Answer:   Knee Pain    XR KNEE LEFT (MIN 4 VIEWS)     4V L Knee     Standing Status:   Future     Number of Occurrences:   1     Standing Expiration Date:   7/30/2022     Order Specific Question:   Reason for exam:     Answer:   Knee Pain    LA ARTHROCENTESIS ASPIR&/INJ MAJOR JT/BURSA W/O US       Treatment Plan:  Treatment options were discussed with Twyla Garcia. We did review her updated plain films and exam findings. I suspect we are dealing primarily with recurrence of her right greater than left knee chondromalacia patella with maltracking. She also sees rheumatology for multiple joint arthralgias. With her worsening pain once again and difficulty walking but is changing positions going up and down stairs, we did repeat her intra-articular steroid injections today bilaterally using 2 cc of Celestone, 2 cc of Marcaine, 1 cc of Xylocaine. This had previously given her about 10 months of pain relief we did review her patella protection program although she may need to modify these with her radicular symptoms and back pain. She is going to be getting an epidural on 7/15/2022 and we will have her start Voltaren gel 4 g 4 times daily with her history of some renal dysfunction which did correct. We will see her back in 4 to 6 weeks potential for imaging and her viscosupplementation was discussed. She will contact us in the interim with questions or concerns     CC: Dr. Barbara Maravilla      This dictation was performed with a verbal recognition program [facility]) and it was checked for errors. It is possible that there are still dictated errors within this office note. If so, please bring any errors to my attention for an addendum.  All efforts were made to ensure that this office note is accurate.

## 2022-07-20 NOTE — PROGRESS NOTES
[facility]  PRE-PROCEDURE INSTRUCTIONS    Procedure date__07/22/22_______  Arrival time__1045__________        Procedure time__1145__________       Screening questions for Pain procedures:  Do you have a current infection? _________  Are you currently taking an antibiotic?______  Are you taking a blood thinner?________    It is not necessary to stop eating or drinking prior to this procedure. We would like you to take your medications for blood pressure as usual.  You may be asked to stop blood thinners such as Coumadin, Plavix, Fragmin, Lovenox, etc., or any anti-inflammatories such as:  Aspirin, Ibuprofen, Advil, Naproxen prior to your procedure. We also ask that you stop any OTC medications that cause additional bleeding    You must make arrangements for a responsible adult to arrive with you and stay in our waiting area during your procedure. They will also need to take you home after your procedure. For your safety you will not be allowed to leave alone or drive yourself home. Also for your safety, it is strongly suggested that someone stay with you the first 24 hours after your procedure. For your comfort, please wear simple loose fitting clothing to the center. Please do not bring valuables. If you have a living will and a durable power of  for healthcare, please bring in a copy.     You will need to bring a photo ID and insurance card    Our goal is to provide you with excellent care so if you have any questions, please contact us at the [facility] at [phone]

## 2022-07-22 ENCOUNTER — HOSPITAL ENCOUNTER (OUTPATIENT)
Age: 62
Setting detail: OUTPATIENT SURGERY
Discharge: HOME | End: 2022-07-22
Attending: ANESTHESIOLOGY | Admitting: ANESTHESIOLOGY
Payer: MEDICARE

## 2022-07-22 ENCOUNTER — APPOINTMENT (OUTPATIENT)
Dept: INTERVENTIONAL RADIOLOGY/VASCULAR | Age: 62
End: 2022-07-22
Attending: ANESTHESIOLOGY
Payer: MEDICARE

## 2022-07-22 VITALS
RESPIRATION RATE: 16 BRPM | HEART RATE: 81 BPM | BODY MASS INDEX: 28.84 KG/M2 | WEIGHT: 137.4 LBS | DIASTOLIC BLOOD PRESSURE: 82 MMHG | SYSTOLIC BLOOD PRESSURE: 125 MMHG | HEIGHT: 58 IN | OXYGEN SATURATION: 96 % | TEMPERATURE: 96.9 F

## 2022-07-22 PROCEDURE — 6360000004 HC RX CONTRAST MEDICATION: Performed by: ANESTHESIOLOGY

## 2022-07-22 PROCEDURE — 6360000002 HC RX W HCPCS: Performed by: ANESTHESIOLOGY

## 2022-07-22 PROCEDURE — 3610000054 HC PAIN LEVEL 3 BASE (NON-OR): Performed by: ANESTHESIOLOGY

## 2022-07-22 PROCEDURE — 2709999900 HC NON-CHARGEABLE SUPPLY: Performed by: ANESTHESIOLOGY

## 2022-07-22 RX ORDER — METHYLPREDNISOLONE ACETATE 80 MG/ML
INJECTION, SUSPENSION INTRA-ARTICULAR; INTRALESIONAL; INTRAMUSCULAR; SOFT TISSUE
Status: COMPLETED | OUTPATIENT
Start: 2022-07-22 | End: 2022-07-22

## 2022-07-22 ASSESSMENT — PAIN - FUNCTIONAL ASSESSMENT: PAIN_FUNCTIONAL_ASSESSMENT: NONE - DENIES PAIN

## 2022-07-22 NOTE — H&P
Patient:  Myra Boas  YOB: 1960  Medical Record #:  [id number]   Place: [address] [place] [address]  Date:  7/22/2022   Physician:  Randy Denney MD    History Obtained From: electronic medical record    HISTORY OF PRESENT ILLNESS    Past Medical History:    No past medical history on file. Past Surgical History:    No past surgical history on file. Medications Prior to Admission:   No current facility-administered medications on file prior to encounter. Current Outpatient Medications on File Prior to Encounter   Medication Sig Dispense Refill    diclofenac sodium (VOLTAREN) 1 % GEL Apply 4 g topically 4 times daily 100 g 1    buPROPion (WELLBUTRIN XL) 300 MG extended release tablet       clobetasol (TEMOVATE) 0.05 % ointment clobetasol 0.05 % topical ointment   APPLY A THIN LAYER  BID TO THE AFFECTED AREA(S) OF THE LEGS      hydrOXYzine (ATARAX) 10 MG tablet hydroxyzine HCl 10 mg tablet      levothyroxine (SYNTHROID) 50 MCG tablet TAKE ONE TABLET BY MOUTH DAILY      LORazepam (ATIVAN) 1 MG tablet lorazepam 1 mg tablet      meloxicam (MOBIC) 15 MG tablet Take 15 mg by mouth daily      mirtazapine (REMERON) 15 MG tablet TAKE ONE TABLET BY MOUTH AT BEDTIME      SUMAtriptan (IMITREX) 50 MG tablet Take 50 mg by mouth every 2 hours as needed      topiramate (TOPAMAX) 100 MG tablet TAKE ONE TABLET BY MOUTH TWICE A DAY      traZODone (DESYREL) 50 MG tablet TAKE ONE TABLET BY MOUTH AT BEDTIME      celecoxib (CELEBREX) 200 MG capsule Take 1 capsule by mouth daily 30 capsule 3     Allergies:  Patient has no known allergies.   Social History     Socioeconomic History    Marital status:      Spouse name: Not on file    Number of children: Not on file    Years of education: Not on file    Highest education level: Not on file   Occupational History    Not on file   Tobacco Use    Smoking status: Every Day     Types: Cigarettes    Smokeless tobacco: Never   Vaping Use    Vaping Use: Never used Substance and Sexual Activity    Alcohol use: Never    Drug use: Not on file    Sexual activity: Not on file   Other Topics Concern    Not on file   Social History Narrative    Not on file     Social Determinants of Health     Financial Resource Strain: Not on file   Food Insecurity: Not on file   Transportation Needs: Not on file   Physical Activity: Not on file   Stress: Not on file   Social Connections: Not on file   Intimate Partner Violence: Not on file   Housing Stability: Not on file     No family history on file. PHYSICAL EXAM:      Ht 4' 10\" (1.473 m)   Wt 139 lb (63 kg)   BMI 29.05 kg/m²  I            ASSESSMENT AND PLAN:    1. Procedure. options, risks and benefits reviewed with patient and expresses understanding.

## 2022-07-22 NOTE — OP NOTE
Patient:  Jovanna Guo  YOB: 1960  Medical Record #:  [id number]   Place: [address] [facility]  Date:  7/22/2022   Physician:  Royal Erasto MD      PRE-PROCEDURE DIAGNOSIS: M54.16    POST-PROCEDURE DIAGNOSIS: M54.16    PROCEDURE:  Midline interlaminar left  L4-5 epidural steroid injection with fluoroscopy and epidurography. BRIEF HISTORY:  The patient presents today to [facility] for a scheduled lumbar epidural steroid injection procedure. The patient was re-evaluated today and is clinically unchanged as compared to my previous evaluation. The patient is clinically stable to proceed with the procedure. PROCEDURE NOTE:  The procedure was again explained to the patient and the previously distributed pre-procedure literature was reviewed. The options, rationale, and benefits of the procedure including pain relief, functional improvement, and increased mobility, as well as the risks of the procedure including but not limited to infection, bleeding, paresthesia, pain, failure to relieve pain, increased pain, headache, allergic reaction, neurologic impairment, local anesthetic, toxicity, and side effects and the potential side effects of corticosteroids were discussed with the patient and informed written consent was obtained from the patient. The patient was positioned in the prone position on the fluoroscopy table. The skin overlying the lumbosacral vertebrae was prepped using Chloraprep and draped in the usual sterile fashion. The L4-5 lumbar intervertebral level was identified using intermittent AP fluoroscopy. The previously identified projection of overlying skin was anesthetized using 2 cc of buffered 1% lidocaine with a 27 gauge needle. A 3.5\" 22g Touhy needle was advanced through a small skin nick in the AP view towards the interlaminar and epidural space. The epidural space was easily identified using loss of resistance to saline.   No difficulty, paresthesia or occurrence of pain was encountered. Careful aspiration was negative for CSF and blood. A total of 1 cc Isovue 300 was injected yielding an epidurogram.    FLUOROSCOPY:  A fluoroscopy unit was utilized to obtain fluoroscopic images for intra-procedural use and assistance. Fluoroscopy was utilized to identify anatomic and radiographic landmarks for the accompanying procedure guidance and not for diagnostic purposes. After negative aspiration 4 cc of therapeutic injectate containing 1 cc of Depo-Medrol 80 mg/cc and 3 ml of lidocaine 1% was injected slowly in aliquots while clinically observing and monitoring the patient with negative aspiration demonstrated between aliquots of injections. At this time no paresthesia or occurrence of pain was present. The needle was then removed, the area was cleansed and a Band-Aid was placed over the injection site. There were no complications. The patient tolerated the procedure well. The procedure was performed using local anesthesia. The patient was transferred by wheelchair with accompaniment to the  Recovery Area and was monitored per protocol. The vital signs remained stable. The patient was discharged in stable condition accompanied by an escort with written instructions after fulfilling the standard discharge criteria. Written follow up instructions were given to the patient.     Estimated Blood Loss: 0ml    Plan:  Follow up in 6-8 weeks      Office: [phone] -Spinal radiation to continue daily with 5 days of irinotecan for radiosensitization  -Continue dexamethasone 4mg Q6 hours  -Monitor lower extremity neuro/strength exam every 4 hours.  If exam worsens please contact MD right away. Spinal radiation to continue daily with 5 days of irinotecan for radiosensitization  Continue dex 4mg Q6 hours  Monitor lower extremity neuro/strength exam every 4 hours.  If exam worsens please contact MD right away. -Spinal radiation to continue daily with 5 days of irinotecan for radiosensitization  -Continue dexamethasone 4mg Q6 hours  -Monitor lower extremity neuro/strength exam every 4 hours.  If exam worsens please contact MD right away. Spinal radiation to continue daily with 5 days of irinotecan for radiosensitization  Continue dex 4mg Q6 hours  Monitor lower extremity neuro/strength exam every 2 hours.  If exam worsens please contact MD right away. Spinal radiation to continue daily with 5 days of irinotecan for radiosensitization  Continue dex 4mg Q6 hours  Monitor lower extremity neuro/strength exam every 2 hours.  If exam worsens please contact MD right away. Spinal radiation to continue daily with 5 days of irinotecan for radiosensitization  Continue dex 4mg Q6 hours  Monitor lower extremity neuro/strength exam every 4 hours.  If exam worsens please contact MD right away.

## 2022-07-22 NOTE — DISCHARGE INSTRUCTIONS
[facility]   [address] [facility], [address] [facility], [address] [facility]     Patient:  Carly Solano  YOB: 1960  Medical Record #:  [id number]   Place: [facility]  Date:  7/22/2022   Physician:  Paxton Bowser MD    Procedure Performed: [unfilled]     Discharge Instructions    Notify Pain Management Services if any of the following occur:    Redness/Swelling at the injection site lasting longer than 2 days  Fever (with redness, swelling, or drainage at the injection site)  Drainage at the injection site  New weakness/ numbness  Severe headache   Loss of bowel/ bladder function    General Instructions: You may experience numbness for several hours following your treatment. You should be cautious using those areas which are numb. Once the numbness wears off, you may apply ice or heat to injection site, if needed. Do not return to work or drive today    Rest today and return to normal activities tomorrow. On average, the steroid takes about 1 week to work and can be up to 2 weeks    You should continue to depend on your primary physician for your medical management of conditions not related to your pain management treatment. Continue to take all your other medications, including blood thinners, as directed by your primary physician unless otherwise instructed. NO changes have been made to your medications. Any changes listed on the discharge are based on patient self reporting. Any EMR warnings regarding drug/drug interactions were dismissed based on current use by the patient, management by prescribing physician and pharmacist and not managed by this physician. Any problem which relates specifically to a treatment or procedure performed today should be directed to the [facility].        [facility]  Division of Interventional Pain Management  [address] [address] [phone]

## 2022-08-01 NOTE — FLOWSHEET NOTE
[facility] ENDOSCOPY AND OUTPATIENT  PRE-PROCEDURE INSTRUCTIONS    Procedure date_8/5/22________Arrival time_____0900_______        Procedure time_____1000_______       Screening questions for Pain procedures:  Do you have a current infection? _____no____  Are you currently taking an antibiotic?__no____  Are you taking a blood thinner?____no____    It is not necessary to stop eating or drinking prior to this procedure. We would like you to take your medications for blood pressure as usual.  You may be asked to stop blood thinners such as Coumadin, Plavix, Fragmin, Lovenox, etc., or any anti-inflammatories such as:  Aspirin, Ibuprofen, Advil, Naproxen prior to your procedure. We also ask that you stop any OTC medications that cause additional bleeding    You must make arrangements for a responsible adult to arrive with you and stay in our waiting area during your procedure. They will also need to take you home after your procedure. For your safety you will not be allowed to leave alone or drive yourself home. Also for your safety, it is strongly suggested that someone stay with you the first 24 hours after your procedure. For your comfort, please wear simple loose fitting clothing to the center. Please do not bring valuables. If you have a living will and a durable power of  for healthcare, please bring in a copy.     You will need to bring a photo ID and insurance card    Our goal is to provide you with excellent care so if you have any questions, please contact us at the [facility] at [phone]

## 2022-08-05 ENCOUNTER — APPOINTMENT (OUTPATIENT)
Dept: INTERVENTIONAL RADIOLOGY/VASCULAR | Age: 62
End: 2022-08-05
Attending: ANESTHESIOLOGY
Payer: MEDICARE

## 2022-08-05 ENCOUNTER — HOSPITAL ENCOUNTER (OUTPATIENT)
Age: 62
Setting detail: OUTPATIENT SURGERY
Discharge: HOME | End: 2022-08-05
Attending: ANESTHESIOLOGY | Admitting: ANESTHESIOLOGY
Payer: MEDICARE

## 2022-08-05 VITALS
OXYGEN SATURATION: 97 % | BODY MASS INDEX: 29.01 KG/M2 | HEIGHT: 58 IN | HEART RATE: 87 BPM | TEMPERATURE: 97.4 F | DIASTOLIC BLOOD PRESSURE: 68 MMHG | WEIGHT: 138.2 LBS | RESPIRATION RATE: 18 BRPM | SYSTOLIC BLOOD PRESSURE: 121 MMHG

## 2022-08-05 PROCEDURE — 2709999900 HC NON-CHARGEABLE SUPPLY: Performed by: ANESTHESIOLOGY

## 2022-08-05 PROCEDURE — 3610000054 HC PAIN LEVEL 3 BASE (NON-OR): Performed by: ANESTHESIOLOGY

## 2022-08-05 PROCEDURE — 6360000002 HC RX W HCPCS: Performed by: ANESTHESIOLOGY

## 2022-08-05 PROCEDURE — 2500000003 HC RX 250 WO HCPCS: Performed by: ANESTHESIOLOGY

## 2022-08-05 RX ORDER — METHYLPREDNISOLONE ACETATE 80 MG/ML
INJECTION, SUSPENSION INTRA-ARTICULAR; INTRALESIONAL; INTRAMUSCULAR; SOFT TISSUE
Status: COMPLETED | OUTPATIENT
Start: 2022-08-05 | End: 2022-08-05

## 2022-08-05 RX ORDER — BUPIVACAINE HYDROCHLORIDE 5 MG/ML
INJECTION, SOLUTION EPIDURAL; INTRACAUDAL
Status: COMPLETED | OUTPATIENT
Start: 2022-08-05 | End: 2022-08-05

## 2022-08-05 RX ORDER — LIDOCAINE HYDROCHLORIDE 10 MG/ML
INJECTION, SOLUTION EPIDURAL; INFILTRATION; INTRACAUDAL; PERINEURAL
Status: COMPLETED | OUTPATIENT
Start: 2022-08-05 | End: 2022-08-05

## 2022-08-05 ASSESSMENT — PAIN DESCRIPTION - DESCRIPTORS: DESCRIPTORS: CRAMPING

## 2022-08-05 ASSESSMENT — PAIN - FUNCTIONAL ASSESSMENT
PAIN_FUNCTIONAL_ASSESSMENT: PREVENTS OR INTERFERES SOME ACTIVE ACTIVITIES AND ADLS
PAIN_FUNCTIONAL_ASSESSMENT: 0-10

## 2022-08-05 ASSESSMENT — PAIN SCALES - GENERAL
PAINLEVEL_OUTOF10: 0
PAINLEVEL_OUTOF10: 0

## 2022-08-05 NOTE — DISCHARGE INSTRUCTIONS
[facility]   [address], [address] [place] [address]     Patient:  Katya Lorenzo  YOB: 1960  Medical Record #:  [id number]   Place: [address] [place] [address]  Date:  8/5/2022   Physician:  Jeannie Mejia MD    Procedure Performed: [unfilled]     Discharge Instructions    Notify Pain Management Services if any of the following occur:    Redness/Swelling at the injection site lasting longer than 2 days  Fever (with redness, swelling, or drainage at the injection site)  Drainage at the injection site  New weakness/ numbness  Severe headache   Loss of bowel/ bladder function    General Instructions: You may experience numbness for several hours following your treatment. You should be cautious using those areas which are numb. Once the numbness wears off, you may apply ice or heat to injection site, if needed. Do not return to work or drive today    Rest today and return to normal activities tomorrow. On average, the steroid takes about 1 week to work and can be up to 2 weeks    You should continue to depend on your primary physician for your medical management of conditions not related to your pain management treatment. Continue to take all your other medications, including blood thinners, as directed by your primary physician unless otherwise instructed. NO changes have been made to your medications. Any changes listed on the discharge are based on patient self reporting. Any EMR warnings regarding drug/drug interactions were dismissed based on current use by the patient, management by prescribing physician and pharmacist and not managed by this physician. Any problem which relates specifically to a treatment or procedure performed today should be directed to the [facility].        [facility]  Division of Interventional Pain Management  [address] [place] [id number]  [phone]

## 2022-08-05 NOTE — H&P
Patient:  Maxine Eugene  YOB: 1960  Medical Record #:  [id number]   Place: [address] [place], [place] [address] [place] [address]  Date:  8/5/2022   Physician:  Kimberley Gibson MD    History Obtained From: electronic medical record    HISTORY OF PRESENT ILLNESS    Past Medical History:        Diagnosis Date    PAF (paroxysmal atrial fibrillation) ([facility] 2020     Past Surgical History:        Procedure Laterality Date    APPENDECTOMY      CARPAL TUNNEL RELEASE      PAIN MANAGEMENT PROCEDURE Left 07/22/2022    LUMBAR EPIDURAL STEROID INJECTION LEFT L4-5 performed by Vibha Brunson MD at [facility]     Medications Prior to Admission:   No current facility-administered medications on file prior to encounter. Current Outpatient Medications on File Prior to Encounter   Medication Sig Dispense Refill    diclofenac sodium (VOLTAREN) 1 % GEL Apply 4 g topically 4 times daily 100 g 1    buPROPion (WELLBUTRIN XL) 300 MG extended release tablet       clobetasol (TEMOVATE) 0.05 % ointment clobetasol 0.05 % topical ointment   APPLY A THIN LAYER  BID TO THE AFFECTED AREA(S) OF THE LEGS      hydrOXYzine (ATARAX) 10 MG tablet hydroxyzine HCl 10 mg tablet      levothyroxine (SYNTHROID) 50 MCG tablet TAKE ONE TABLET BY MOUTH DAILY      LORazepam (ATIVAN) 1 MG tablet lorazepam 1 mg tablet      meloxicam (MOBIC) 15 MG tablet Take 15 mg by mouth daily      mirtazapine (REMERON) 15 MG tablet TAKE ONE TABLET BY MOUTH AT BEDTIME      SUMAtriptan (IMITREX) 50 MG tablet Take 50 mg by mouth every 2 hours as needed      topiramate (TOPAMAX) 100 MG tablet TAKE ONE TABLET BY MOUTH TWICE A DAY      traZODone (DESYREL) 50 MG tablet TAKE ONE TABLET BY MOUTH AT BEDTIME      celecoxib (CELEBREX) 200 MG capsule Take 1 capsule by mouth daily 30 capsule 3     Allergies:  Patient has no known allergies.   Social History     Socioeconomic History    Marital status:      Spouse name: Not on file    Number of children: Not on file    Years of education: Not on file    Highest education level: Not on file   Occupational History    Not on file   Tobacco Use    Smoking status: Every Day     Types: Cigarettes    Smokeless tobacco: Never   Vaping Use    Vaping Use: Never used   Substance and Sexual Activity    Alcohol use: Never    Drug use: Never    Sexual activity: Not Currently   Other Topics Concern    Not on file   Social History Narrative    Not on file     Social Determinants of Health     Financial Resource Strain: Not on file   Food Insecurity: Not on file   Transportation Needs: Not on file   Physical Activity: Not on file   Stress: Not on file   Social Connections: Not on file   Intimate Partner Violence: Not on file   Housing Stability: Not on file     Family History   Problem Relation Age of Onset    Diabetes Mother     High Blood Pressure Father     Heart Attack Father          PHYSICAL EXAM:      /68   Pulse 87   Temp 97.4 °F (36.3 °C) (Temporal)   Resp 18   Ht 4' 10\" (1.473 m)   Wt 138 lb 3.2 oz (62.7 kg)   SpO2 97%   BMI 28.88 kg/m²  I            ASSESSMENT AND PLAN:    1. Procedure. options, risks and benefits reviewed with patient and expresses understanding.

## 2022-08-05 NOTE — OP NOTE
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 BRIEF HISTORY:  The patient presents today to [facility] for a scheduled sciatic nerve block procedure. The patient was re-evaluated today and is clinically unchanged as compared to my previous evaluation. The patient is clinically stable to proceed with the procedure. PROCEDURE NOTE:  The procedure was again explained to the patient and the previously distributed pre-procedure literature was reviewed. The options, rationale, and benefits of the procedure including pain relief, functional improvement, and increased mobility, as well as the risks of the procedure including but not limited to infection, bleeding, paresthesia, pain, failure to relieve pain, increased pain, headache, allergic reaction, neurologic impairment, local anesthetic, toxicity, and side effects and the potential side effects of corticosteroids were discussed with the patient and informed written consent was obtained from the patient. The patient was brought to the fluoroscopy suite and placed in the prone position. The LEFT sacral and gluteal area was prepped in the usual sterile fashion with Chloraprep and then draped. Intermittent AP fluoroscopy was utilized to localize the radiographic landmarks. A standard perisciatic nerve block approach was used. The sciatic notch, at its superolateral border was localized at its junction with the ilium rostral to the acetabulum and lateral to the SI joint and lateral to the sciatic nerve.  It was localized at the radiographic marker of the overlying sciatic nerve and piriformis muscle obetween the lateral aspect of the sacrum and the greater trochanter. The superficial skin projection was anesthetized with buffered lidocaine 1% 2 cc using a 27-gauge needle. A spinal needle was then inserted slowly under direct intermittent AP fluoroscopy towards the ilium and the overlying piriformis muscle. Negative aspiration was re-demonstrated and therapeutic injectate consisting of 6 cc of lidocaine 1%, 1 cc of bupivacaine 0.5% and 1 cc of Depo-Medrol 40 mg/cc was injected without pain, paresthesia, difficulty, or complaints. The needle was removed, the area cleansed, a Band-Aid placed over the injection site. Patient tolerated the procedure well. There were no complications. The patient was transferred, by wheelchair or stretcher, with accompaniment to the recovery area where the vital signs remained stable. There was sensory or motor blockade in the lower extremity. This procedure was done using local anesthesia only. The patient was discharged in stable condition accompanied with an escort after fulfilling standard discharge criteria. FLUOROSCOPY:  A fluoroscopy unit was utilized to obtain fluoroscopic images for intra-procedural use and assistance. Fluoroscopy was utilized to identify anatomic and radiographic landmarks for the accompanying procedure guidance and not for diagnostic purposes.       Estimated Blood Loss: 0ml      Plan:  Follow up in 4-6 weeks      Office: [phone]

## 2022-08-10 NOTE — PROGRESS NOTES
[facility] ENDOSCOPY AND OUTPATIENT  PRE-PROCEDURE INSTRUCTIONS    Procedure date__08/12/2022_______Arrival time_0900___________        Procedure time___1000_________       Screening questions for Pain procedures:  Do you have a current infection? no _________  Are you currently taking an antibiotic? _no_____  Are you taking a blood thinner?___no_____    It is not necessary to stop eating or drinking prior to this procedure. We would like you to take your medications for blood pressure as usual.  You may be asked to stop blood thinners such as Coumadin, Plavix, Fragmin, Lovenox, etc., or any anti-inflammatories such as:  Aspirin, Ibuprofen, Advil, Naproxen prior to your procedure. We also ask that you stop any OTC medications that cause additional bleeding    You must make arrangements for a responsible adult to arrive with you and stay in our waiting area during your procedure. They will also need to take you home after your procedure. For your safety you will not be allowed to leave alone or drive yourself home. Also for your safety, it is strongly suggested that someone stay with you the first 24 hours after your procedure. For your comfort, please wear simple loose fitting clothing to the center. Please do not bring valuables. If you have a living will and a durable power of  for healthcare, please bring in a copy.     You will need to bring a photo ID and insurance card    Our goal is to provide you with excellent care so if you have any questions, please contact us at the [facility] at [phone]

## 2022-08-11 DIAGNOSIS — G62.9 NEUROPATHY: ICD-10-CM

## 2022-08-12 ENCOUNTER — HOSPITAL ENCOUNTER (OUTPATIENT)
Age: 62
Setting detail: OUTPATIENT SURGERY
Discharge: HOME | End: 2022-08-12
Attending: ANESTHESIOLOGY | Admitting: ANESTHESIOLOGY
Payer: COMMERCIAL

## 2022-08-12 ENCOUNTER — APPOINTMENT (OUTPATIENT)
Dept: INTERVENTIONAL RADIOLOGY/VASCULAR | Age: 62
End: 2022-08-12
Attending: ANESTHESIOLOGY
Payer: COMMERCIAL

## 2022-08-12 VITALS
HEIGHT: 58 IN | WEIGHT: 139 LBS | SYSTOLIC BLOOD PRESSURE: 124 MMHG | TEMPERATURE: 96.9 F | DIASTOLIC BLOOD PRESSURE: 70 MMHG | RESPIRATION RATE: 16 BRPM | HEART RATE: 72 BPM | BODY MASS INDEX: 29.18 KG/M2 | OXYGEN SATURATION: 99 %

## 2022-08-12 PROCEDURE — 3610000054 HC PAIN LEVEL 3 BASE (NON-OR): Performed by: ANESTHESIOLOGY

## 2022-08-12 PROCEDURE — 2709999900 HC NON-CHARGEABLE SUPPLY: Performed by: ANESTHESIOLOGY

## 2022-08-12 PROCEDURE — 2500000003 HC RX 250 WO HCPCS: Performed by: ANESTHESIOLOGY

## 2022-08-12 PROCEDURE — 6360000002 HC RX W HCPCS: Performed by: ANESTHESIOLOGY

## 2022-08-12 RX ORDER — BUPIVACAINE HYDROCHLORIDE 5 MG/ML
INJECTION, SOLUTION EPIDURAL; INTRACAUDAL
Status: COMPLETED | OUTPATIENT
Start: 2022-08-12 | End: 2022-08-12

## 2022-08-12 RX ORDER — METHYLPREDNISOLONE ACETATE 80 MG/ML
INJECTION, SUSPENSION INTRA-ARTICULAR; INTRALESIONAL; INTRAMUSCULAR; SOFT TISSUE
Status: COMPLETED | OUTPATIENT
Start: 2022-08-12 | End: 2022-08-12

## 2022-08-12 RX ORDER — LIDOCAINE HYDROCHLORIDE 10 MG/ML
INJECTION, SOLUTION EPIDURAL; INFILTRATION; INTRACAUDAL; PERINEURAL
Status: COMPLETED | OUTPATIENT
Start: 2022-08-12 | End: 2022-08-12

## 2022-08-12 ASSESSMENT — PAIN DESCRIPTION - DESCRIPTORS: DESCRIPTORS: STABBING

## 2022-08-12 NOTE — H&P
Patient:  Harry Knowles  YOB: 1960  Medical Record #:  [id number]   Place: [address] [facility]  Date:  8/12/2022   Physician:  Artie Zelaya MD    History Obtained From: electronic medical record    HISTORY OF PRESENT ILLNESS    Past Medical History:        Diagnosis Date    PAF (paroxysmal atrial fibrillation) ([facility] [address] 2020     Past Surgical History:        Procedure Laterality Date    APPENDECTOMY      CARPAL TUNNEL RELEASE      NERVE BLOCK Left 8/5/2022    LEFT SCIATIC NERVE BLOCK performed by Tomas Lazar MD at [address] Left 07/22/2022    LUMBAR EPIDURAL STEROID INJECTION LEFT L4-5 performed by Tomas Lazar MD at [facility]     Medications Prior to Admission:   No current facility-administered medications on file prior to encounter. Current Outpatient Medications on File Prior to Encounter   Medication Sig Dispense Refill    diclofenac sodium (VOLTAREN) 1 % GEL Apply 4 g topically 4 times daily 100 g 1    buPROPion (WELLBUTRIN XL) 300 MG extended release tablet       clobetasol (TEMOVATE) 0.05 % ointment clobetasol 0.05 % topical ointment   APPLY A THIN LAYER  BID TO THE AFFECTED AREA(S) OF THE LEGS      hydrOXYzine (ATARAX) 10 MG tablet hydroxyzine HCl 10 mg tablet      levothyroxine (SYNTHROID) 50 MCG tablet TAKE ONE TABLET BY MOUTH DAILY      LORazepam (ATIVAN) 1 MG tablet lorazepam 1 mg tablet      meloxicam (MOBIC) 15 MG tablet Take 15 mg by mouth daily      mirtazapine (REMERON) 15 MG tablet TAKE ONE TABLET BY MOUTH AT BEDTIME      SUMAtriptan (IMITREX) 50 MG tablet Take 50 mg by mouth every 2 hours as needed      topiramate (TOPAMAX) 100 MG tablet TAKE ONE TABLET BY MOUTH TWICE A DAY      traZODone (DESYREL) 50 MG tablet TAKE ONE TABLET BY MOUTH AT BEDTIME      celecoxib (CELEBREX) 200 MG capsule Take 1 capsule by mouth daily 30 capsule 3     Allergies:  Patient has no known allergies.   Social History Socioeconomic History    Marital status:      Spouse name: Not on file    Number of children: Not on file    Years of education: Not on file    Highest education level: Not on file   Occupational History    Not on file   Tobacco Use    Smoking status: Every Day     Types: Cigarettes    Smokeless tobacco: Never   Vaping Use    Vaping Use: Never used   Substance and Sexual Activity    Alcohol use: Never    Drug use: Never    Sexual activity: Not Currently   Other Topics Concern    Not on file   Social History Narrative    Not on file     Social Determinants of Health     Financial Resource Strain: Not on file   Food Insecurity: Not on file   Transportation Needs: Not on file   Physical Activity: Not on file   Stress: Not on file   Social Connections: Not on file   Intimate Partner Violence: Not on file   Housing Stability: Not on file     Family History   Problem Relation Age of Onset    Diabetes Mother     High Blood Pressure Father     Heart Attack Father          PHYSICAL EXAM:      /65   Pulse 75   Temp 96.9 °F (36.1 °C) (Temporal)   Resp 16   Ht 4' 10\" (1.473 m)   Wt 139 lb (63 kg)   SpO2 99%   BMI 29.05 kg/m²  I            ASSESSMENT AND PLAN:    1. Procedure. options, risks and benefits reviewed with patient and expresses understanding.

## 2022-08-12 NOTE — OP NOTE
the sacrum and the greater trochanter. The superficial skin projection was anesthetized with buffered lidocaine 1% 2 cc using a 27-gauge needle. A spinal needle was then inserted slowly under direct intermittent AP fluoroscopy towards the ilium and the overlying piriformis muscle. Negative aspiration was re-demonstrated and therapeutic injectate consisting of 6 cc of lidocaine 1%, 1 cc of bupivacaine 0.5% and 1 cc of Depo-Medrol 40 mg/cc was injected without pain, paresthesia, difficulty, or complaints. The needle was removed, the area cleansed, a Band-Aid placed over the injection site. Patient tolerated the procedure well. There were no complications. The patient was transferred, by wheelchair or stretcher, with accompaniment to the recovery area where the vital signs remained stable. There was sensory or motor blockade in the lower extremity. This procedure was done using local anesthesia only. The patient was discharged in stable condition accompanied with an escort after fulfilling standard discharge criteria. FLUOROSCOPY:  A fluoroscopy unit was utilized to obtain fluoroscopic images for intra-procedural use and assistance. Fluoroscopy was utilized to identify anatomic and radiographic landmarks for the accompanying procedure guidance and not for diagnostic purposes.       Estimated Blood Loss: 0ml      Plan:  Follow up in 4-6 weeks      Office: [phone]

## 2022-08-12 NOTE — DISCHARGE INSTRUCTIONS
[facility]   [address], [address] [place] [address]     Patient:  Tucker Petersen  YOB: 1960  Medical Record #:  [id number]   Place: [facility]  Date:  8/12/2022   Physician:  Karol Mason MD    Procedure Performed: [unfilled]     Discharge Instructions    Notify Pain Management Services if any of the following occur:    Redness/Swelling at the injection site lasting longer than 2 days  Fever (with redness, swelling, or drainage at the injection site)  Drainage at the injection site  New weakness/ numbness  Severe headache   Loss of bowel/ bladder function    General Instructions: You may experience numbness for several hours following your treatment. You should be cautious using those areas which are numb. Once the numbness wears off, you may apply ice or heat to injection site, if needed. Do not return to work or drive today    Rest today and return to normal activities tomorrow. On average, the steroid takes about 1 week to work and can be up to 2 weeks    You should continue to depend on your primary physician for your medical management of conditions not related to your pain management treatment. Continue to take all your other medications, including blood thinners, as directed by your primary physician unless otherwise instructed. NO changes have been made to your medications. Any changes listed on the discharge are based on patient self reporting. Any EMR warnings regarding drug/drug interactions were dismissed based on current use by the patient, management by prescribing physician and pharmacist and not managed by this physician. Any problem which relates specifically to a treatment or procedure performed today should be directed to the [facility].        [facility]  Division of Interventional Pain Management  [address] [id number] [phone]

## 2022-09-07 NOTE — PROGRESS NOTES
[facility] ENDOSCOPY AND OUTPATIENT  PRE-PROCEDURE INSTRUCTIONS    Procedure date__09/09/2022_______Arrival time__1000_________        Procedure time__1100__________       Screening questions for Pain procedures:  Do you have a current infection? no________  Are you currently taking an antibiotic?__no____  Are you taking a blood thinner?__no______    It is not necessary to stop eating or drinking prior to this procedure. We would like you to take your medications for blood pressure as usual.  You may be asked to stop blood thinners such as Coumadin, Plavix, Fragmin, Lovenox, etc., or any anti-inflammatories such as:  Aspirin, Ibuprofen, Advil, Naproxen prior to your procedure. We also ask that you stop any OTC medications that cause additional bleeding    You must make arrangements for a responsible adult to arrive with you and stay in our waiting area during your procedure. They will also need to take you home after your procedure. For your safety you will not be allowed to leave alone or drive yourself home. Also for your safety, it is strongly suggested that someone stay with you the first 24 hours after your procedure. For your comfort, please wear simple loose fitting clothing to the center. Please do not bring valuables. If you have a living will and a durable power of  for healthcare, please bring in a copy.     You will need to bring a photo ID and insurance card    Our goal is to provide you with excellent care so if you have any questions, please contact us at the [facility] at [phone]

## 2022-09-09 ENCOUNTER — APPOINTMENT (OUTPATIENT)
Dept: INTERVENTIONAL RADIOLOGY/VASCULAR | Age: 62
End: 2022-09-09
Attending: ANESTHESIOLOGY
Payer: COMMERCIAL

## 2022-09-09 ENCOUNTER — HOSPITAL ENCOUNTER (OUTPATIENT)
Age: 62
Setting detail: OUTPATIENT SURGERY
Discharge: HOME | End: 2022-09-09
Attending: ANESTHESIOLOGY | Admitting: ANESTHESIOLOGY
Payer: COMMERCIAL

## 2022-09-09 VITALS
HEIGHT: 58 IN | WEIGHT: 138.13 LBS | TEMPERATURE: 97.6 F | BODY MASS INDEX: 28.99 KG/M2 | SYSTOLIC BLOOD PRESSURE: 118 MMHG | RESPIRATION RATE: 18 BRPM | HEART RATE: 82 BPM | OXYGEN SATURATION: 99 % | DIASTOLIC BLOOD PRESSURE: 64 MMHG

## 2022-09-09 PROCEDURE — 2500000003 HC RX 250 WO HCPCS: Performed by: ANESTHESIOLOGY

## 2022-09-09 PROCEDURE — 2709999900 HC NON-CHARGEABLE SUPPLY: Performed by: ANESTHESIOLOGY

## 2022-09-09 PROCEDURE — 3610000056 HC PAIN LEVEL 4 BASE (NON-OR): Performed by: ANESTHESIOLOGY

## 2022-09-09 PROCEDURE — 6360000002 HC RX W HCPCS: Performed by: ANESTHESIOLOGY

## 2022-09-09 RX ORDER — LIDOCAINE HYDROCHLORIDE 10 MG/ML
INJECTION, SOLUTION EPIDURAL; INFILTRATION; INTRACAUDAL; PERINEURAL
Status: COMPLETED | OUTPATIENT
Start: 2022-09-09 | End: 2022-09-09

## 2022-09-09 RX ORDER — BUPIVACAINE HYDROCHLORIDE 5 MG/ML
INJECTION, SOLUTION EPIDURAL; INTRACAUDAL
Status: COMPLETED | OUTPATIENT
Start: 2022-09-09 | End: 2022-09-09

## 2022-09-09 RX ORDER — METHYLPREDNISOLONE ACETATE 80 MG/ML
INJECTION, SUSPENSION INTRA-ARTICULAR; INTRALESIONAL; INTRAMUSCULAR; SOFT TISSUE
Status: COMPLETED | OUTPATIENT
Start: 2022-09-09 | End: 2022-09-09

## 2022-09-09 ASSESSMENT — PAIN DESCRIPTION - DESCRIPTORS: DESCRIPTORS: ACHING;NAGGING

## 2022-09-09 ASSESSMENT — PAIN SCALES - GENERAL: PAINLEVEL_OUTOF10: 0

## 2022-09-09 NOTE — DISCHARGE INSTRUCTIONS
[facility]   [address]  [phone]      Patient:  Ramiro Price  YOB: 1960  Medical Record #:  [id number]   Place: [address] [place] [address]  Date:  9/9/2022   Physician:  Fan Santos MD    Procedure Performed: [unfilled]     Discharge Instructions    Notify Pain Management Services if any of the following occur:    Redness/Swelling at the injection site lasting longer than 2 days  Fever (with redness, swelling, or drainage at the injection site)  Drainage at the injection site  New weakness/ numbness  Severe headache   Loss of bowel/ bladder function    General Instructions: You may experience numbness for several hours following your treatment. You should be cautious using those areas which are numb. Once the numbness wears off, you may apply ice or heat to injection site, if needed. Do not return to work or drive today    Rest today and return to normal activities tomorrow. On average, the steroid takes about 1 week to work and can be up to 2 weeks    You should continue to depend on your primary physician for your medical management of conditions not related to your pain management treatment. Continue to take all your other medications, including blood thinners, as directed by your primary physician unless otherwise instructed. NO changes have been made to your medications. Any changes listed on the discharge are based on patient self reporting. Any EMR warnings regarding drug/drug interactions were dismissed based on current use by the patient, management by prescribing physician and pharmacist and not managed by this physician. Any problem which relates specifically to a treatment or procedure performed today should be directed to the [facility].        [facility]  1000 [facility], [address] [place] [address], [address] [facility] [address]  [phone]

## 2022-09-09 NOTE — H&P
Patient:  Gauri Age  YOB: 1960  Medical Record #:  [id number]   Place: [address]  Date:  9/9/2022   Physician:  Tony Michel MD    History Obtained From: electronic medical record    HISTORY OF PRESENT ILLNESS    Past Medical History:        Diagnosis Date    PAF (paroxysmal atrial fibrillation) ([facility] [address] 2020     Past Surgical History:        Procedure Laterality Date    APPENDECTOMY      CARPAL TUNNEL RELEASE      NERVE BLOCK Left 8/5/2022    LEFT SCIATIC NERVE BLOCK performed by Catalino Marcano MD at [address] Right 8/12/2022    RIGHT SCIATIC NERVE BLOCK performed by Catalino Marcano MD at [address] Left 07/22/2022    LUMBAR EPIDURAL STEROID INJECTION LEFT L4-5 performed by Catalino Marcano MD at [facility] ENDOSCOPY     Medications Prior to Admission:   No current facility-administered medications on file prior to encounter.      Current Outpatient Medications on File Prior to Encounter   Medication Sig Dispense Refill    diclofenac sodium (VOLTAREN) 1 % GEL Apply 4 g topically 4 times daily 100 g 1    buPROPion (WELLBUTRIN XL) 300 MG extended release tablet       clobetasol (TEMOVATE) 0.05 % ointment clobetasol 0.05 % topical ointment   APPLY A THIN LAYER  BID TO THE AFFECTED AREA(S) OF THE LEGS      hydrOXYzine (ATARAX) 10 MG tablet hydroxyzine HCl 10 mg tablet      levothyroxine (SYNTHROID) 50 MCG tablet TAKE ONE TABLET BY MOUTH DAILY      LORazepam (ATIVAN) 1 MG tablet lorazepam 1 mg tablet      meloxicam (MOBIC) 15 MG tablet Take 15 mg by mouth daily      mirtazapine (REMERON) 15 MG tablet TAKE ONE TABLET BY MOUTH AT BEDTIME      SUMAtriptan (IMITREX) 50 MG tablet Take 50 mg by mouth every 2 hours as needed      topiramate (TOPAMAX) 100 MG tablet TAKE ONE TABLET BY MOUTH TWICE A DAY      traZODone (DESYREL) 50 MG tablet TAKE ONE TABLET BY MOUTH AT BEDTIME      celecoxib (CELEBREX) 200 MG capsule Take 1 capsule by mouth daily 30 capsule 3     Allergies:  Patient has no known allergies. Social History     Socioeconomic History    Marital status:      Spouse name: Not on file    Number of children: Not on file    Years of education: Not on file    Highest education level: Not on file   Occupational History    Not on file   Tobacco Use    Smoking status: Every Day     Types: Cigarettes    Smokeless tobacco: Never   Vaping Use    Vaping Use: Never used   Substance and Sexual Activity    Alcohol use: Never    Drug use: Never    Sexual activity: Not Currently   Other Topics Concern    Not on file   Social History Narrative    Not on file     Social Determinants of Health     Financial Resource Strain: Not on file   Food Insecurity: Not on file   Transportation Needs: Not on file   Physical Activity: Not on file   Stress: Not on file   Social Connections: Not on file   Intimate Partner Violence: Not on file   Housing Stability: Not on file     Family History   Problem Relation Age of Onset    Diabetes Mother     High Blood Pressure Father     Heart Attack Father          PHYSICAL EXAM:      Ht 4' 10\" (1.473 m)   Wt 137 lb (62.1 kg)   BMI 28.63 kg/m²  I            ASSESSMENT AND PLAN:    1. Procedure. options, risks and benefits reviewed with patient and expresses understanding.

## 2022-09-09 NOTE — OP NOTE
Patient:  Gokul Arias  YOB: 1960  Medical Record #:  [id number]   Place: [address] [facility]  Date:  9/9/2022   Physician:  MD CHARU [facility]il [facility] JOINT [facility]JE[facility]    PRE-PROCEDURE DIAGNOSIS: bilateral sacroiliac joint generated pain (M53.3)    POST-PROCEDURE DIAGNOSIS:  bilateral sacroiliac joint generated pain (M53.3)    PROCEDURE:  bilateral sacroiliac joint injection with fluoroscopy. BRIEF HISTORY:  The patient returns today to the [facility] for scheduled SI joint injection procedure. The patient is clinically unchanged from my previous evaluation. The procedure was explained to the patient, and the previously distributed pre-procedure literature was reviewed. The options, rationale and benefits of the procedure, including functional improvement, pain relief, and increased mobility, as well as the risks of the procedure including but not limited to infection, bleeding, paresthesia, pain, failure to relieve pain, increased pain, headache, allergic reaction and neurologic impairment were discussed with the patient. Risks of corticosteroids were discussed with the patient and informed written consent was obtained from the patient. PROCEDURE NOTE:  The patient was positioned prone on the fluoroscopy table. The bilateral sacroiliac joint was identified using AP fluoroscopy. The skin overlying thesacroiliac joint was widely prepped with chloraprep and draped in the usual sterile fashion. A slight oblique and inferioroblique projection was utilized to superimpose both the posterior and anterior lucency of the sacroiliac joint. A 3.5  inch 22 gauge spinal needle was advanced in the AP view towards the caudal medial aspect of the sacroiliac joint. This was done under fluoroscopic guidance. Entry into the joint capsule was felt as well as verified via fluoroscopy in multiple views.   Careful aspiration was negative for CSF and blood prior to that injection and afterwards. A total of 3 cc of injectate was injected. The injectate contained 1 cc of depomedrol 40 mg per cc and 2 cc of Bupivacaine 0.5%. The injectate was injected in small increments while monitoring the patient. The patient tolerated the procedure well. There were no complications. The patient complained of no paresthesia during the injection. The needle was removed, the area was cleansed, and a Band-Aid was placed over the injection site. There were no complications. The patient tolerated the procedure well. The procedure was performed using local anesthesia. The patient was transferred with accompaniment to the ecovery area where the vital signs remained stable. The patient was discharged accompanied with an escort with written instructions after fulfilling standard discharge criteria.   Follow-up instructions were given to the patient and are as follows:      Estimated Blood Loss: 0ml      Plan:  Follow up in 4-6 weeks    Office: [phone]

## 2022-09-09 NOTE — PROGRESS NOTES
Patient verbalized understanding of discharge instructions, medications given, and potential complications including pain. Patient instructed to call Doctor if complications occur. Discussed potential for post injection weakness and numbness with patient. It was explained that due to the location of the injection numbness and/or weakness could occur in their lower extremities immediately following the injection and could last up to a couple of hours. Explained to patient they are not to stand following injection until a staff member is in the room with them to offer assistance in order for them to remain free from falls. Patient verbalized understanding, will continue to monitor patient as a high fall risk while in facility.

## 2022-09-20 RX ORDER — TRAMADOL HYDROCHLORIDE 50 MG/1
50 TABLET ORAL EVERY 6 HOURS PRN
COMMUNITY

## 2022-09-20 RX ORDER — GABAPENTIN 400 MG/1
400 CAPSULE ORAL DAILY
COMMUNITY

## 2022-09-20 NOTE — PROGRESS NOTES
[facility] [facility] time____0710________        Surgery time___0840_________    Take the following medications with a sip of water: Follow your MD/Surgeons pre-procedure instructions regarding your medications     Do not eat or drink anything after 12:00 midnight prior to your surgery. This includes water chewing gum, mints and ice chips. You may brush your teeth and gargle the morning of your surgery, but do not swallow the water     Please see your family doctor/pediatrician for a history and physical and/or concerning medications. H&P 9/20/22 Dr. Phuc Shipley    Bring any test results/reports from your physicians office. If you are under the care of a heart doctor or specialist doctor, please be aware that you may be asked to them for clearance    You may be asked to stop blood thinners such as Coumadin, Plavix, Fragmin, Lovenox, etc., or any anti-inflammatories such as:  Aspirin, Ibuprofen, Advil, Naproxen prior to your surgery. We also ask that you stop any OTC medications such as fish oil, vitamin E, glucosamine, garlic, Multivitamins, COQ 10, etc.    We ask that you do not smoke 24 hours prior to surgery  We ask that you do not  drink any alcoholic beverages 24 hours prior to surgery     You must make arrangements for a responsible adult to take you home after your surgery. For your safety you will not be allowed to leave alone or drive yourself home. Your surgery will be cancelled if you do not have a ride home. Also for your safety, it is strongly suggested that someone stay with you the first 24 hours after your surgery. A parent or legal guardian must accompany a child scheduled for surgery and plan to stay at the hospital until the child is discharged. Please do not bring other children with you. For your comfort, please wear simple loose fitting clothing to the hospital.  Please do not bring valuables.  Wear short sleeve button down shirt or loose shirt and bring eye drops or eye ointment. Do not wear any make-up or nail polish on your fingers or toes      For your safety, please do not wear any jewelry or body piercing's on the day of surgery. All jewelry must be removed. If you have dentures, they will be removed before going to operating room. For your convenience, we will provide you with a container. If you wear contact lenses or glasses, they will be removed, please bring a case for them. If you have a living will and a durable power of  for healthcare, please bring in a copy. As part of our patient safety program to minimize surgical site infections, we ask you to do the following:    Please notify your surgeon if you develop any illness between         now and the  day of your surgery. This includes a cough, cold, fever, sore throat, nausea,         or vomiting, and diarrhea, etc.   Please notify your surgeon if you experience dizziness, shortness         of breath or blurred vision between now and the time of your surgery. Do not shave your operative site 96 hours prior to surgery. For face and neck surgery, men may use an electric razor 48 hours   prior to surgery. You may shower the night before surgery or the morning of   your surgery with an antibacterial soap. You will need to bring a photo ID and insurance card    [facility] has an onsite pharmacy, would you like to utilize our pharmacy     If you will be staying overnight and use a C-pap machine, please bring   your C-pap to hospital     Our goal is to provide you with excellent care, therefore, visitors will be limited to two(2) in the room at a time so that we may focus on providing this care for you. Please contact pre-admission testing if you have any further questions.                  [employer] [facility] phone number:  [phone]  Please note these are generalized instructions for all surgical cases, you may be provided with more specific instructions according to your surgery. C-Difficile admission screening and protocol:       * Admitted with diarrhea? [] YES    [x]  NO     *Prior history of C-Diff. In last 3 months? [] YES    [x]  NO     *Antibiotic use in the past 6-8 weeks? [x]  NO    []  YES                 If yes, which ANTIBIOTIC AND REASON______     *Prior hospitalization or nursing home in the last month? []  YES    [x]  NO        SAFETY FIRST. .call before you fall

## 2022-09-26 ENCOUNTER — OFFICE VISIT (OUTPATIENT)
Dept: ORTHOPEDIC SURGERY | Age: 62
End: 2022-09-26
Payer: COMMERCIAL

## 2022-09-26 VITALS — BODY MASS INDEX: 28.34 KG/M2 | HEIGHT: 58 IN | WEIGHT: 135 LBS

## 2022-09-26 DIAGNOSIS — G56.21 ULNAR NEURITIS, RIGHT: ICD-10-CM

## 2022-09-26 DIAGNOSIS — G56.22 ULNAR NEURITIS, LEFT: ICD-10-CM

## 2022-09-26 DIAGNOSIS — M48.02 CERVICAL STENOSIS OF SPINE: Primary | ICD-10-CM

## 2022-09-26 PROCEDURE — 99214 OFFICE O/P EST MOD 30 MIN: CPT | Performed by: PHYSICIAN ASSISTANT

## 2022-09-26 NOTE — PROGRESS NOTES
History of present illness:   Ms. Valentina Paz is a pleasant 65 y.o. old female kindly referred by Rosa M Porras MD for consultation regarding Ms. Twyla Garcia's neck and bilateral arm pain. She states the pain began insidiously 10 years ago. Her pain has steadily worsened since then. She rates her neck pain 4-8/2010 VAS. She rates her shoulder and arm pain 2/10. She describes the pain as aching, throbbing pain in her neck with numbness and tingling into her left and right upper extremity. The arm pain radiates to her fingers bilaterally. She denies significant weakness of her left or right arm. She denies, lower extremity symptoms, gait abnormality and bowel or bladder dysfunction. The pain does occasionally interfere with her sleep. She has tried formal physical therapy without relief. She has also tried NSAIDs without relief. She is currently on Neurontin 400 mg daily and tramadol for pain. She uses Voltaren gel occasionally. Past medical history:   Her past medical history has been reviewed. Past Medical History:   Diagnosis Date    Back pain     Migraines     PAF (paroxysmal atrial fibrillation) ([facility] [address] 2020    Thyroid disease         Her past surgical history has been reviewed.     Past Surgical History:   Procedure Laterality Date    APPENDECTOMY      BACK INJECTION Bilateral 09/09/2022    BILATERAL SACROILIAC JOINT INJECTION performed by Frieda Adams MD at [facility] Bilateral     COLONOSCOPY      HYSTERECTOMY (CERVIX STATUS UNKNOWN)      NERVE BLOCK Left 08/05/2022    LEFT SCIATIC NERVE BLOCK performed by Frieda Adams MD at [facility] Right 08/12/2022    RIGHT SCIATIC NERVE BLOCK performed by Frieda Adams MD at [address] Left 07/22/2022    LUMBAR EPIDURAL STEROID INJECTION LEFT L4-5 performed by Frieda Adams MD at [place] [address]        Her medications and allergies were reviewed. Current Outpatient Medications   Medication Sig Dispense Refill    gabapentin (NEURONTIN) 400 MG capsule Take 400 mg by mouth daily. traMADol (ULTRAM) 50 MG tablet Take 50 mg by mouth every 6 hours as needed for Pain. diclofenac sodium (VOLTAREN) 1 % GEL Apply 4 g topically 4 times daily 100 g 1    buPROPion (WELLBUTRIN XL) 300 MG extended release tablet Take 300 mg by mouth every morning      clobetasol (TEMOVATE) 0.05 % ointment Apply topically 2 times daily as needed      hydrOXYzine (ATARAX) 10 MG tablet Take 10 mg by mouth every 4 hours as needed      levothyroxine (SYNTHROID) 50 MCG tablet TAKE ONE TABLET BY MOUTH DAILY      LORazepam (ATIVAN) 1 MG tablet Take 1 mg by mouth every 6 hours as needed. topiramate (TOPAMAX) 100 MG tablet TAKE ONE TABLET BY MOUTH TWICE A DAY       No current facility-administered medications for this visit. Allergies   Allergen Reactions    Nsaids Other (See Comments)     Kidney issues and H/Oulcers        Her social history has been reviewed. Social History     Occupational History    Not on file   Tobacco Use    Smoking status: Every Day     Packs/day: 0.50     Types: Cigarettes    Smokeless tobacco: Never   Vaping Use    Vaping Use: Never used   Substance and Sexual Activity    Alcohol use: Never    Drug use: Never    Sexual activity: Not Currently         Her family history has been reviewed. Family History   Problem Relation Age of Onset    Diabetes Mother     High Blood Pressure Father     Heart Attack Father            Review of Systems:  I have reviewed the clinically relevant past medical history, medications, allergies, family history, social history, and 13 point Review of Systems from the patient's recent history form & documented any details relevant to today's presenting complaints in the history above.  The patient's self-reported past medical history, medications, allergies, family history, social history, and Review of Systems form from today's date have been scanned into the chart under the \"Media\" tab. Physical examination:   Ms. Sumeet Garcia's most recent vitals:  Vitals  Height: 4' 10\" (147.3 cm)  Weight: 135 lb (61.2 kg)  Body mass index is 28.22 kg/m². General Exam:  She is well-developed and well-nourished, is in obvious pain and alert and oriented to person, place, and time. She demonstrates appropriate mood and affect. She walks with a normal gait. HEENT:   Her cervical flexion, extension, and axial rotation are significantly reduced with pain. Her skin is warm and dry. She has moderate tenderness over her cervical spine and no obvious muscle spasm. The skin over her cervical spine is normal without surgical scar. Upper extremities:  She has 4+ to 5/5 strength of her interosseous muscles, wrist dorsiflexors and volarflexors, biceps, triceps, deltoids, and internal and external rotators of her shoulders, bilaterally. Her biceps, triceps, bracheoradialis, quadriceps and achilles reflexes are 2+, bilaterally. Sensation is intact to light touchfrom C6 to C8. She has no clonus and negative Morrell's bilaterally. Positive Tinel's bilateral elbows. Negative Tinel's and Phalen's test bilateral wrist.    Examination of the left and right shoulder: There is no deformity. There is no erythema. There is no  soft tissue swelling. Deltoid region is not tender to palpation. Scapula/ trapezius is  tender to palpation. There is no weakness or pain with rotator cuff testing. Shoulder Active ROM -is near full without pain.     Imaging:   X Rays were not obtained in the office today:  Narrative   EXAMINATION:   4 XRAY VIEWS OF THE LUMBAR SPINE; 5 XRAY VIEWS OF THE CERVICAL SPINE       6/20/2022 2:23 pm       COMPARISON:   05/25/2022, 06/06/2022       HISTORY:   ORDERING SYSTEM PROVIDED HISTORY: Radiculopathy of lumbar region   TECHNOLOGIST PROVIDED HISTORY:   Reason for Exam: Radiculopathy of lumbar region; ORDERING SYSTEM PROVIDED   HISTORY: Cervical radiculopathy   TECHNOLOGIST PROVIDED HISTORY:   Reason for Exam: Cervical radiculopathy       FINDINGS:   Cervical spine: Multilevel degenerative disc disease is identified in the   cervical spine, which appears mild to moderate in amount, and greatest at the   level of C4-C5, C5-C6 and C6-C7. Multilevel facet arthropathy is identified. Focal reversal the normal cervical lordosis seen in the mid cervical spine. No significant prevertebral soft tissue swelling. No osseous fracture is   identified. There is 2 mm of anterolisthesis of C3 on C4, though not   significantly changed on flexion, and reduces to approximately 1 mm on   extension. There is minimal retrolisthesis of C5 on C6 measuring 2 mm, not   significantly change between flexion or extension. Minimal 2 mm   retrolisthesis of C6 on C7, also not significantly changed on flexion or   extension. Lung apices are clear. The odontoid process appears intact. Lateral masses of C1 appear well aligned on C2. Lumbar spine: Based on numbering of the recent MRI of the lumbar spine, again   noted is mild L4 on L5 grade 1 anterolisthesis. Multilevel degenerative disc   disease is seen within the lower thoracic and lumbar spine, without acute   osseous fracture. Facet arthropathy is seen at the level of L3-L4, L4-L5 and   L5-S1. The degree of L4 on L5 spondylolisthesis is stable on flexion and   extension and measures approximately 2 mm. No instability. Impression   1. Degenerative changes are seen in the cervical spine, without acute osseous   fracture. 2. Minimal spondylolisthesis as above, with no significant instability on   flexion or extension. 3. Mild reversal of the normal cervical lordosis. 4. No acute lumbar spine fracture.    5. Multilevel degenerative disc disease as well as lower lumbar facet   arthropathy is identified, with minimal Disclaimer: This note was generated with use of a verbal recognition program (DRAGON) and an attempt was made to check for errors. It is possible that there are still dictated errors within this office note. If so, please bring any significant errors to my attention for an addendum. All efforts were made to ensure that this office note is accurate.

## 2022-09-27 ENCOUNTER — TELEPHONE (OUTPATIENT)
Dept: ORTHOPEDIC SURGERY | Age: 62
End: 2022-09-27

## 2022-09-27 NOTE — TELEPHONE ENCOUNTER
Patient notified that her MRI was approved.  Given the number to schedule and told to follow up in the office 2-3 days later to go over the results

## 2022-09-30 ENCOUNTER — ANESTHESIA EVENT (OUTPATIENT)
Dept: SURGERY | Age: 62
End: 2022-09-30
Payer: MEDICARE

## 2022-09-30 NOTE — PRE-PROCEDURE INSTRUCTIONS
[address]        Pre-Op Phone Call:     Patient Name: Kyle Garcia     Telephone Information:   Mobile [phone]     Home phone:  [phone]    Surgery Time:    8:40 AM     Arrival Time:  0710     Left extended Message:  Yes     Message left with:     Recent change in health status:  NA     Advised of transportation/ policy:  Yes     NPO policy reviewed: Yes     Advised to take morning heart/blood pressure medications with sips of water morning of surgery? Yes     Instructed to bring eye drops, photo identification, and insurance card day of surgery? Yes     Advised to wear short sleeved button down shirt (no T-shirt underneath):  Yes     Advised not to wear jewelry, hairpins, or pantyhose day of surgery? Yes     Advised not to wear make-up and to wash face day of surgery?   Yes    Remarks:  Ext msg left on VM      Electronically signed by:  Arianne Bedoya RN at 9/30/2022 10:41 AM

## 2022-10-03 ENCOUNTER — ANESTHESIA (OUTPATIENT)
Dept: SURGERY | Age: 62
End: 2022-10-03
Payer: MEDICARE

## 2022-10-03 ENCOUNTER — HOSPITAL ENCOUNTER (OUTPATIENT)
Age: 62
Setting detail: OUTPATIENT SURGERY
Discharge: HOME | End: 2022-10-03
Attending: OPHTHALMOLOGY | Admitting: OPHTHALMOLOGY
Payer: MEDICARE

## 2022-10-03 VITALS
HEIGHT: 58 IN | OXYGEN SATURATION: 100 % | SYSTOLIC BLOOD PRESSURE: 117 MMHG | RESPIRATION RATE: 15 BRPM | HEART RATE: 74 BPM | WEIGHT: 137 LBS | BODY MASS INDEX: 28.76 KG/M2 | TEMPERATURE: 96.3 F | DIASTOLIC BLOOD PRESSURE: 76 MMHG

## 2022-10-03 PROCEDURE — 6370000000 HC RX 637 (ALT 250 FOR IP): Performed by: OPHTHALMOLOGY

## 2022-10-03 PROCEDURE — 3600000002 HC SURGERY LEVEL 2 BASE: Performed by: OPHTHALMOLOGY

## 2022-10-03 PROCEDURE — 2580000003 HC RX 258: Performed by: STUDENT IN AN ORGANIZED HEALTH CARE EDUCATION/TRAINING PROGRAM

## 2022-10-03 PROCEDURE — 6360000002 HC RX W HCPCS: Performed by: NURSE ANESTHETIST, CERTIFIED REGISTERED

## 2022-10-03 PROCEDURE — 3700000000 HC ANESTHESIA ATTENDED CARE: Performed by: OPHTHALMOLOGY

## 2022-10-03 PROCEDURE — 2709999900 HC NON-CHARGEABLE SUPPLY: Performed by: OPHTHALMOLOGY

## 2022-10-03 PROCEDURE — 3600000012 HC SURGERY LEVEL 2 ADDTL 15MIN: Performed by: OPHTHALMOLOGY

## 2022-10-03 PROCEDURE — 2500000003 HC RX 250 WO HCPCS: Performed by: OPHTHALMOLOGY

## 2022-10-03 PROCEDURE — 3700000001 HC ADD 15 MINUTES (ANESTHESIA): Performed by: OPHTHALMOLOGY

## 2022-10-03 PROCEDURE — 7100000010 HC PHASE II RECOVERY - FIRST 15 MIN: Performed by: OPHTHALMOLOGY

## 2022-10-03 PROCEDURE — 2580000003 HC RX 258: Performed by: NURSE ANESTHETIST, CERTIFIED REGISTERED

## 2022-10-03 RX ORDER — SODIUM CHLORIDE 0.9 % (FLUSH) 0.9 %
5-40 SYRINGE (ML) INJECTION EVERY 12 HOURS SCHEDULED
Status: CANCELLED | OUTPATIENT
Start: 2022-10-03

## 2022-10-03 RX ORDER — SODIUM CHLORIDE 0.9 % (FLUSH) 0.9 %
5-40 SYRINGE (ML) INJECTION PRN
Status: CANCELLED | OUTPATIENT
Start: 2022-10-03

## 2022-10-03 RX ORDER — FENTANYL CITRATE 50 UG/ML
INJECTION, SOLUTION INTRAMUSCULAR; INTRAVENOUS PRN
Status: DISCONTINUED | OUTPATIENT
Start: 2022-10-03 | End: 2022-10-03 | Stop reason: SDUPTHER

## 2022-10-03 RX ORDER — SODIUM CHLORIDE 9 MG/ML
INJECTION, SOLUTION INTRAVENOUS CONTINUOUS
Status: DISCONTINUED | OUTPATIENT
Start: 2022-10-03 | End: 2022-10-03 | Stop reason: HOSPADM

## 2022-10-03 RX ORDER — PROPOFOL 10 MG/ML
INJECTION, EMULSION INTRAVENOUS PRN
Status: DISCONTINUED | OUTPATIENT
Start: 2022-10-03 | End: 2022-10-03 | Stop reason: SDUPTHER

## 2022-10-03 RX ORDER — SODIUM CHLORIDE 9 MG/ML
INJECTION, SOLUTION INTRAVENOUS PRN
Status: CANCELLED | OUTPATIENT
Start: 2022-10-03

## 2022-10-03 RX ORDER — SODIUM CHLORIDE 9 MG/ML
INJECTION, SOLUTION INTRAVENOUS CONTINUOUS PRN
Status: DISCONTINUED | OUTPATIENT
Start: 2022-10-03 | End: 2022-10-03 | Stop reason: SDUPTHER

## 2022-10-03 RX ORDER — SODIUM CHLORIDE 0.9 % (FLUSH) 0.9 %
5-40 SYRINGE (ML) INJECTION PRN
Status: DISCONTINUED | OUTPATIENT
Start: 2022-10-03 | End: 2022-10-03 | Stop reason: HOSPADM

## 2022-10-03 RX ORDER — SODIUM CHLORIDE 0.9 % (FLUSH) 0.9 %
5-40 SYRINGE (ML) INJECTION EVERY 12 HOURS SCHEDULED
Status: DISCONTINUED | OUTPATIENT
Start: 2022-10-03 | End: 2022-10-03 | Stop reason: HOSPADM

## 2022-10-03 RX ORDER — SODIUM CHLORIDE 9 MG/ML
INJECTION, SOLUTION INTRAVENOUS PRN
Status: DISCONTINUED | OUTPATIENT
Start: 2022-10-03 | End: 2022-10-03 | Stop reason: HOSPADM

## 2022-10-03 RX ORDER — TETRACAINE HYDROCHLORIDE 5 MG/ML
SOLUTION OPHTHALMIC
Status: COMPLETED | OUTPATIENT
Start: 2022-10-03 | End: 2022-10-03

## 2022-10-03 RX ORDER — SODIUM CHLORIDE 9 MG/ML
INJECTION, SOLUTION INTRAVENOUS CONTINUOUS
Status: CANCELLED | OUTPATIENT
Start: 2022-10-03

## 2022-10-03 RX ORDER — MIDAZOLAM HYDROCHLORIDE 1 MG/ML
INJECTION INTRAMUSCULAR; INTRAVENOUS PRN
Status: DISCONTINUED | OUTPATIENT
Start: 2022-10-03 | End: 2022-10-03 | Stop reason: SDUPTHER

## 2022-10-03 RX ADMIN — MIDAZOLAM 1 MG: 1 INJECTION INTRAMUSCULAR; INTRAVENOUS at 08:41

## 2022-10-03 RX ADMIN — SODIUM CHLORIDE: 9 INJECTION, SOLUTION INTRAVENOUS at 07:58

## 2022-10-03 RX ADMIN — FENTANYL CITRATE 50 MCG: 50 INJECTION INTRAMUSCULAR; INTRAVENOUS at 08:35

## 2022-10-03 RX ADMIN — PROPOFOL 70 MG: 10 INJECTION, EMULSION INTRAVENOUS at 08:35

## 2022-10-03 RX ADMIN — MIDAZOLAM 1 MG: 1 INJECTION INTRAMUSCULAR; INTRAVENOUS at 08:35

## 2022-10-03 RX ADMIN — SODIUM CHLORIDE: 9 INJECTION, SOLUTION INTRAVENOUS at 08:25

## 2022-10-03 ASSESSMENT — PAIN - FUNCTIONAL ASSESSMENT: PAIN_FUNCTIONAL_ASSESSMENT: 0-10

## 2022-10-03 ASSESSMENT — PAIN SCALES - GENERAL
PAINLEVEL_OUTOF10: 0
PAINLEVEL_OUTOF10: 0

## 2022-10-03 NOTE — ANESTHESIA PRE PROCEDURE
[facility] Department of Anesthesiology  Pre-Anesthesia Evaluation/Consultation       Name:  Jorge Recio  : 1960  Age:  65 y.o.                                            MRN:  [id number]  Date: 10/3/2022           Surgeon: Surgeon(s):  Giana Kennedy MD    Procedure: Procedure(s):  BLEPHAROPLASTY - BILATERAL UPPER LID     Allergies   Allergen Reactions    Nsaids Other (See Comments)     Kidney issues and H/Oulcers     Patient Active Problem List   Diagnosis    Acquired spondylolisthesis of lumbosacral region    Foraminal stenosis of lumbosacral region    Arthropathy of lumbar facet joint    Low back pain    Neuropathy    Chondromalacia of both patellae    Acute pain of both knees    Synovitis of both knee joints    Ulnar neuritis, right    Ulnar neuritis, left    Cervical stenosis of spine     Past Medical History:   Diagnosis Date    Back pain     Migraines     PAF [facility]paroxysmal atrial fibrillation) ([facility] [address]     Thyroid disease      Past Surgical History:   Procedure Laterality Date    APPENDECTOMY      BACK INJECTION Bilateral 2022    BILATERAL SACROILIAC JOINT INJECTION performed by Nayeli Juan MD at [facility] Bilateral     COLONOSCOPY      HYSTERECTOMY (CERVIX STATUS UNKNOWN)      NERVE BLOCK Left 2022    LEFT SCIATIC NERVE BLOCK performed by Nayeli Juan MD at [facility] Right 2022    RIGHT SCIATIC NERVE BLOCK performed by Nayeli Juan MD at [address] Left 2022    LUMBAR EPIDURAL STEROID INJECTION LEFT L4-5 performed by Nayeli Juan MD at [address] History     Tobacco Use    Smoking status: Every Day     Packs/day: 0.50     Types: Cigarettes    Smokeless tobacco: Never   Vaping Use    Vaping Use: Never used   Substance Use Topics    Alcohol use: Never    Drug use: Never     Medications  No current facility-administered medications on file prior to encounter. Current Outpatient Medications on File Prior to Encounter   Medication Sig Dispense Refill    gabapentin (NEURONTIN) 400 MG capsule Take 400 mg by mouth daily.  traMADol (ULTRAM) 50 MG tablet Take 50 mg by mouth every 6 hours as needed for Pain.  diclofenac sodium (VOLTAREN) 1 % GEL Apply 4 g topically 4 times daily 100 g 1    buPROPion (WELLBUTRIN XL) 300 MG extended release tablet Take 300 mg by mouth every morning      clobetasol (TEMOVATE) 0.05 % ointment Apply topically 2 times daily as needed      hydrOXYzine (ATARAX) 10 MG tablet Take 10 mg by mouth every 4 hours as needed      levothyroxine (SYNTHROID) 50 MCG tablet TAKE ONE TABLET BY MOUTH DAILY      LORazepam (ATIVAN) 1 MG tablet Take 1 mg by mouth every 6 hours as needed.       topiramate (TOPAMAX) 100 MG tablet TAKE ONE TABLET BY MOUTH TWICE A DAY       Current Facility-Administered Medications   Medication Dose Route Frequency Provider Last Rate Last Admin    0.9 % sodium chloride infusion   IntraVENous Continuous May Randhawa MD        sodium chloride flush 0.9 % injection 5-40 mL  5-40 mL IntraVENous 2 times per day May Randhawa MD        sodium chloride flush 0.9 % injection 5-40 mL  5-40 mL IntraVENous PRN May Randhawa MD        0.9 % sodium chloride infusion   IntraVENous PRN May Randhawa  mL/hr at 10/03/22 0758 New Bag at 10/03/22 0758     Vital Signs (Current)   Vitals:    10/03/22 0755   BP: 128/74   Pulse: 78   Resp: 16   Temp: 97.1 °F (36.2 °C)   SpO2: 96%     Vital Signs Statistics (for past 48 hrs)     Temp  Av.1 °F (36.2 °C)  Min: 97.1 °F (36.2 °C)   Min taken time: 10/03/22 0755  Max: 97.1 °F (36.2 °C)   Max taken time: 10/03/22 0755  Pulse  Av  Min: 66   Min taken time: 10/03/22 0755  Max: 66   Max taken time: 10/03/22 0755  Resp  Av  Min: 12   Min taken time: 10/03/22 0755  Max: 16   Max taken time: 10/03/22 0755  BP  Min: 128/74   Min taken time: 10/03/22 0755  Max: 128/74   Max taken time: 10/03/22 075  SpO2  Av %  Min: 96 %   Min taken time: 10/03/22 0755  Max: 96 %   Max taken time: 10/03/22 075    BP Readings from Last 3 Encounters:   10/03/22 128/74   22 118/64   22 124/70     BMI  Body mass index is 28.63 kg/m². Estimated body mass index is 28.63 kg/m² as calculated from the following:    Height as of this encounter: 4' 10\" (1.473 m). Weight as of this encounter: 137 lb (62.1 kg). CBC No results found for: WBC, RBC, HGB, HCT, MCV, RDW, PLT  CMP  No results found for: NA, K, CL, CO2, BUN, CREATININE, GFRAA, AGRATIO, LABGLOM, GLUCOSE, GLU, PROT, CALCIUM, BILITOT, ALKPHOS, AST, ALT  BMP  No results found for: NA, K, CL, CO2, BUN, CREATININE, CALCIUM, GFRAA, LABGLOM, GLUCOSE, GLU  POCGlucose  No results for input(s): GLUCOSE in the last 72 hours.    Coags  No results found for: PROTIME, INR, APTT  HCG (If Applicable) No results found for: PREGTESTUR, PREGSERUM, HCG, HCGQUANT   ABGs No results found for: PHART, PO2ART, NWN6GJB, GZO0QKO, BEART, E1WMGZIX   Type & Screen (If Applicable)  No results found for: LABABO, LABRH                         BMI: Wt Readings from Last 3 Encounters:       NPO Status:   Date of last liquid consumption: 10/02/22   Time of last liquid consumption:    Date of last solid food consumption: 10/02/22      Time of last solid consumption:        Anesthesia Evaluation  Patient summary reviewed no history of anesthetic complications:   Airway: Mallampati: III  TM distance: >3 FB   Neck ROM: full  Mouth opening: > = 3 FB   Dental:    (+) upper dentures      Pulmonary:Negative Pulmonary ROS and normal exam                               Cardiovascular:  Exercise tolerance: good (>4 METS),   (+) dysrhythmias (EF 55): atrial fibrillation,       ECG reviewed  Rhythm: irregular  Rate: normal  Echocardiogram reviewed         Beta Blocker:  Not on Beta Blocker Neuro/Psych:   (+) neuromuscular disease:, headaches:,             GI/Hepatic/Renal: Neg GI/Hepatic/Renal ROS       (-) GERD       Endo/Other:    (+) hypothyroidism::., .    (-) blood dyscrasia               Abdominal:             Vascular: negative vascular ROS. Other Findings:           Anesthesia Plan      MAC     ASA 3       Induction: intravenous. Anesthetic plan and risks discussed with patient. Plan discussed with CRNA. This pre-anesthesia assessment may be used as a history and physical.    DOS STAFF ADDENDUM:    Pt seen and examined, chart reviewed (including anesthesia, drug and allergy history). No interval changes to history and physical examination. Anesthetic plan, risks, benefits, alternatives, and personnel involved discussed with patient. Questions and concerns addressed. Patient(family) verbalized an understanding and agrees to proceed.       Augustin Red MD  October 3, 2022  8:08 AM

## 2022-10-03 NOTE — OP NOTE
Title of Operation:  Bilateral upper lid functional blepharoplasty, CPT code 97541-70  Indications for Surgery:  65year-old female who presented visually significant bilateral upper lid dermatochalasis, for which medically necessary blepharoplasty was indicated. After benefits, risks and alternatives were discussed, the patient elected to proceed with surgical repair. Preoperative Diagnosis:  Bilateral upper lid dermatochalasis  Postoperative Diagnosis:  Bilateral upper lid dermatochalasis  Anesthesia:   Monitored anesthesia care (MAC) and Local.  Specimen (Bacteriological, Pathological or other):  None  Prosthetic Device/Implant:  None  Surgeon:  Raquel Reddy MD  Assistant:  None  Estimated blood loss:  Less than 5mL  Surgeons Narrative: The patient was greeted in the preoperative area. The correct place for surgery was identified and marked. The patient was taken back to the operating room and placed in supine position. Time-out for safety was held. The upper eyelid crease and an ellipse of upper lid skin were measured and marked on each side with a marking pen. Intravenous sedation was administered. A 50:50 mix of 2% lidocaine with 1:100,000 epinephrine and 0.75% marcaine was injected in these areas for local anesthesia. The patient was then prepped and draped in the usual sterile fashion. The right upper lid was addressed first. The skin was incised with a #15 blade. The strip of excess skin was removed with Celena scissors. Careful hemostasis was achieved with bipolar cautery. The orbital septum was incised, the preaponeurotic fat pads were identified and trimmed as needed, with a clamp/cut/cautery technique. The skin was then closed with a running 6-0 plain gut suture. The exact same procedure was performed in the left upper lid. Antibiotic ointment was applied in the eyes and on the incision sites. The patient tolerated the procedure very well. There were no complications.

## 2022-10-03 NOTE — DISCHARGE INSTRUCTIONS
Post-Operative Instructions for Ophthalmic Plastic Surgery      ACTIVITY:     Today, rest quietly at home. Sit upright as much as possible and sleep with your head elevated for 1 week. Do not perform strenuous activity for 1 week. You may take a shower or bath 24 hours after surgery. It is Ok if the sutures get wet. A responsible person must accompany you home. Do not drive for 24 hours. EYE  CARE:   Place baggies of frozen peas or corn on each operated eye for 20 min on and 20 min off while awake; discontinue at bedtime. Do this for 2 days. Apply antibiotic ointment to the sutures twice a day for 2 weeks. Small amounts of bloody drainage may occur after surgery and will usually stop with firm pressure applied for 5 minutes; use a clean washcloth. If this does not stop the bleeding, then call immediately. Some soreness, swelling, bruising, and slightly blurry vision are normal.  If you have decreased vision, excessive swelling or bruising, or bulging forward of the eyeball, then please call immediately. DIET:    You may resume your regular diet. No alcohol for 24 hours. Resume your regular medications. Refer to surgical packet for instructions on when to restart Coumadin, Plavix and aspirin. PAIN:   You may take Tylenol (acetaminophen) for pain. If this does not relieve your pain, then please call. OTHER:   If you experience nausea, vomiting, or excessive pain, please contact your surgeon immediately.       Please call our main number if you have any questions or problems:  [phone]

## 2022-10-03 NOTE — ANESTHESIA POSTPROCEDURE EVALUATION
[facility] Department of Anesthesiology  Post-Anesthesia Note       Name:  Lyubov Spencer                                  Age:  65 y.o. MRN:  [id number]     Last Vitals & Oxygen Saturation: /76   Pulse 74   Temp (!) 96.3 °F (35.7 °C) (Temporal)   Resp 15   Ht 4' 10\" (1.473 m)   Wt 137 lb (62.1 kg)   SpO2 100%   BMI 28.63 kg/m²   Patient Vitals for the past 4 hrs:   BP Temp Temp src Pulse Resp SpO2 Height Weight   10/03/22 0905 117/76 -- -- 74 15 100 % -- --   10/03/22 0901 (!) 122/59 (!) 96.3 °F (35.7 °C) Temporal 77 15 90 % -- --   10/03/22 0755 128/74 97.1 °F (36.2 °C) Temporal 78 16 96 % 4' 10\" (1.473 m) 137 lb (62.1 kg)       Level of consciousness:  Awake, alert    Respiratory: Respirations easy, no distress. Stable. Cardiovascular: Hemodynamically stable. Hydration: Adequate. PONV: Adequately managed. Post-op pain: Adequately controlled. Post-op assessment: Tolerated anesthetic well without complication. Complications:  None.     Hawk Douglas MD  October 3, 2022   10:41 AM

## 2022-10-03 NOTE — H&P
Date of Surgery Update:  Deb Garcia was seen, history and physical examination reviewed, and patient examined by me today. There have been no significant clinical changes since the completion of the previous history and physical. The surgical site was confirmed by the patient and me. The risk, benefits, and alternatives of the proposed procedure have been explained to the patient (or appropriate guardian) and understanding verbalized. All questions answered. Patient wishes to proceed.     Electronically signed by: Benson Lott MD,10/3/2022,8:25 AM

## 2022-10-07 ENCOUNTER — HOSPITAL ENCOUNTER (OUTPATIENT)
Dept: MRI IMAGING | Age: 62
Discharge: HOME | End: 2022-10-07
Payer: MEDICARE

## 2022-10-07 DIAGNOSIS — M48.02 CERVICAL STENOSIS OF SPINE: ICD-10-CM

## 2022-10-07 PROCEDURE — 72141 MRI NECK SPINE W/O DYE: CPT

## 2022-10-10 ENCOUNTER — OFFICE VISIT (OUTPATIENT)
Dept: ORTHOPEDIC SURGERY | Age: 62
End: 2022-10-10
Payer: MEDICARE

## 2022-10-10 ENCOUNTER — TELEPHONE (OUTPATIENT)
Dept: ORTHOPEDIC SURGERY | Age: 62
End: 2022-10-10

## 2022-10-10 DIAGNOSIS — M48.02 CERVICAL STENOSIS OF SPINE: ICD-10-CM

## 2022-10-10 DIAGNOSIS — G56.22 ULNAR NEURITIS, LEFT: Primary | ICD-10-CM

## 2022-10-10 DIAGNOSIS — G56.21 ULNAR NEURITIS, RIGHT: ICD-10-CM

## 2022-10-10 PROCEDURE — 4004F PT TOBACCO SCREEN RCVD TLK: CPT | Performed by: PHYSICIAN ASSISTANT

## 2022-10-10 PROCEDURE — G8428 CUR MEDS NOT DOCUMENT: HCPCS | Performed by: PHYSICIAN ASSISTANT

## 2022-10-10 PROCEDURE — G8417 CALC BMI ABV UP PARAM F/U: HCPCS | Performed by: PHYSICIAN ASSISTANT

## 2022-10-10 PROCEDURE — 3017F COLORECTAL CA SCREEN DOC REV: CPT | Performed by: PHYSICIAN ASSISTANT

## 2022-10-10 PROCEDURE — G8484 FLU IMMUNIZE NO ADMIN: HCPCS | Performed by: PHYSICIAN ASSISTANT

## 2022-10-10 PROCEDURE — 99213 OFFICE O/P EST LOW 20 MIN: CPT | Performed by: PHYSICIAN ASSISTANT

## 2022-10-10 NOTE — PROGRESS NOTES
Subjective:      Patient ID: Bret Lawler is a 65 y.o. female who is here for follow up evaluation of cervical radicular pain and possible ulnar neuritis bilateral upper extremities. She recently underwent cervical spine MRI. She has not undergone EMG testing of the upper extremities. She is having difficulty scheduling with that provider. She rates her neck pain 4-8/2010 VAS. She rates her shoulder and arm pain 2/10. She describes the pain as aching, throbbing pain in her neck with numbness and tingling into her left and right upper extremity. The arm pain radiates to her fingers bilaterally. She denies significant weakness of her left or right arm. She denies, lower extremity symptoms, gait abnormality and bowel or bladder dysfunction. The pain does occasionally interfere with her sleep. She has tried formal physical therapy without relief. She has also tried NSAIDs without relief. She is currently on Neurontin 400 mg daily and tramadol for pain. She uses Voltaren gel occasionally. Review Of Systems:   I have reviewed the clinically relevant past medical history, medications, allergies, family history, social history, and 13 point Review of Systems from the patient's recent history form & documented any details relevant to today's presenting complaints in the history above. The patient's self-reported past medical history, medications, allergies, family history, social history, and Review of Systems form from 9/26/2022 date have been scanned into the chart under the \"Media\" tab.      Past Medical History:   Diagnosis Date    Back pain     Migraines     PAF (paroxysmal atrial fibrillation) ([facility] [address] 2020    Thyroid disease        Family History   Problem Relation Age of Onset    Diabetes Mother     High Blood Pressure Father     Heart Attack Father        Past Surgical History:   Procedure Laterality Date    APPENDECTOMY      BACK INJECTION Bilateral 09/09/2022    BILATERAL SACROILIAC JOINT General Exam:  She is well-developed and well-nourished, is in obvious pain and alert and oriented to person, place, and time. She demonstrates appropriate mood and affect. She walks with a normal gait. HEENT:   Her cervical flexion, extension, and axial rotation are significantly reduced with pain. Her skin is warm and dry. She has moderate tenderness over her cervical spine and no obvious muscle spasm. The skin over her cervical spine is normal without surgical scar. Upper extremities:  She has 4+ to 5/5 strength of her interosseous muscles, wrist dorsiflexors and volarflexors, biceps, triceps, deltoids, and internal and external rotators of her shoulders, bilaterally. Her biceps, triceps, bracheoradialis, quadriceps and achilles reflexes are 2+, bilaterally. Sensation is intact to light touchfrom C6 to C8. She has no clonus and negative Morrell's bilaterally. Positive Tinel's bilateral elbows. Negative Tinel's and Phalen's test bilateral wrist.     Examination of the left and right shoulder: There is no deformity. There is no erythema. There is no  soft tissue swelling. Deltoid region is not tender to palpation. Scapula/ trapezius is  tender to palpation. There is no weakness or pain with rotator cuff testing. Shoulder Active ROM -is near full without pain. X Rays: not performed in the office today:   MRI: Obtained from [facility] or an outside facility. EXAMINATION:   MRI OF THE CERVICAL SPINE WITHOUT CONTRAST 10/7/2022 7:25 am       TECHNIQUE:   Multiplanar multisequence MRI of the cervical spine was performed without the   administration of intravenous contrast.       COMPARISON:   None. HISTORY:   ORDERING SYSTEM PROVIDED HISTORY: Cervical stenosis of spine   TECHNOLOGIST PROVIDED HISTORY:   Reason for Exam: Dx: Cervical stenosis of spine M48.02 (ICD-10-CM)       05/11/2011       FINDINGS:   BONES/ALIGNMENT: Vertebral body heights are maintained.   There is smooth reversal of the cervical lordosis with new 2 mm degenerate anterolisthesis of   C3 on C4 and increased 4 mm degenerative anterolisthesis of C4 on C5. No   marrow edema or suspect osseous lesion is evident. SPINAL CORD: The visualized spinal cord has normal signal and morphology. No   evidence of mass or abnormal fluid collection within the spinal canal.  There   is mild patchy T2 hyperintensity in the james, nonspecific but generally   ascribed to sequela of chronic microvascular ischemia. SOFT TISSUES: Partially imaged right thyroid nodule measures at least 2.1 cm   in diameter. C2-C3: Disc height and signal maintained. Mild left neural foraminal   narrowing secondary to facet hypertrophy. No right neural foraminal   narrowing. No spinal canal stenosis. C3-C4: Mild disc height loss and desiccation. Moderate left and mild right   neural foraminal narrowing secondary to uncovertebral and left greater than   right facet hypertrophy. No spinal canal stenosis. C4-C5: Mild disc height loss and desiccation. No left neural foraminal   narrowing. Mild right neural foraminal narrowing secondary to uncovertebral   and facet hypertrophy. Mild spinal canal stenosis secondary to disc bulge. C5-C6: Moderate disc height loss and desiccation. Mild bilateral neural   foraminal narrowing secondary to uncovertebral hypertrophy. Mild spinal   canal stenosis secondary to disc bulge. C6-C7: Moderate disc height loss and desiccation. Mild bilateral neural   foraminal narrowing secondary to uncovertebral hypertrophy. Mild spinal   canal stenosis secondary to disc bulge. C7-T1: Mild disc height loss and desiccation. Mild bilateral neural   foraminal narrowing secondary to disc bulge and mild facet hypertrophy. No   spinal canal stenosis. Impression   1. Mild-to-moderate multilevel degenerative disc disease and facet   arthropathy.    2. Smooth reversal of the cervical lordosis with minimal new degenerative   anterolisthesis of C3 on C4 and increased mild degenerate anterolisthesis of   C4 on C5.   3. Mild spinal canal stenosis at the C4-5 through C6-7 levels. 4. Multilevel neural foraminal as detailed above and greatest involving the   left C4 neural foramen where it is moderate. Diagnosis:       ICD-10-CM    1. Ulnar neuritis, left  G56.22 EMG      2. Ulnar neuritis, right  G56.21 EMG      3. Cervical stenosis of spine  M48.02 EMG     MEHUL - Fidelia Eid MD, Pain Management, [facility]           Assessment and Plan:       Assessment:  Mild to moderate multilevel degenerative disc disease and facet arthropathy of the cervical spine. Mild spinal canal stenosis C4-5 through C6-7. Multilevel neuroforaminal stenosis. Positive Tinel's at the left and right elbow, possible ulnar neuritis or ulnar nerve entrapment. EMG pending. I had an extensive discussion with Ms. Twyla Garica regarding the natural history, etiology, and long term consequences of her condition. I have presented reasonable alternatives to the patient's proposed care, treatment, and services. Risks and benefits of the treatment options also reviewed in detail. I have outlined a treatment plan with them. She has had full opportunity to ask her questions. I have answered them all to her satisfaction. I feel that Ms. Queen Kash Garcia understands our discussion today. Plan:  Further Imaging-she will be referred to Shirlene Joe for EMG testing of bilateral upper extremities. Procedures-   At this time, I do not believe spinal surgery is indicated. She may benefit other therapeutic options such as epidural steroid injection, facet injection or other interventional procedures. For this reason, I am going to refer to Dr. Bernie Ayala for Interventional Pain Management for an evaluation and treatment.         Follow up-    Call or return to clinic if these symptoms worsen or fail to improve as anticipated. Michelle Trujillo PA-C   Senior Physician Assistant   [facility]                                         Disclaimer: This note was generated with use of a verbal recognition program (DRAGON) and an attempt was made to check for errors. It is possible that there are still dictated errors within this office note. If so, please bring any significant errors to my attention for an addendum. All efforts were made to ensure that this office note is accurate.

## 2022-10-11 ENCOUNTER — TELEPHONE (OUTPATIENT)
Dept: ORTHOPEDIC SURGERY | Age: 62
End: 2022-10-11

## 2022-10-11 DIAGNOSIS — R93.89 ABNORMAL MRI, NECK: Primary | ICD-10-CM

## 2022-10-11 NOTE — TELEPHONE ENCOUNTER
General Question     Subject: REFERRAL TO ENDOCRINOLOGIST  Patient and /or Facility Request: Keagan Garcia  Contact Number: [phone]    PATIENT HAS CALLED AND STATED THAT SHE NEEDS AN REFERRAL TO AN ENDOCRINOLOGIST. SHE CAN BE REACHED AT THE NUMBER ABOVE FOR ANY QUESTIONS AND CONCERNS.  [facility] NUMBER IS [phone]

## 2022-10-12 ENCOUNTER — TELEPHONE (OUTPATIENT)
Dept: ORTHOPEDIC SURGERY | Age: 62
End: 2022-10-12

## 2022-10-12 NOTE — TELEPHONE ENCOUNTER
General Question     Subject: PATIENT CALL AND WOULD LIKE TO HAVE A NEW  REFERRAL FAX OVER TO DR. SERVIN OFFICE THE FAX NUMBER [phone] WITH THE NEW REFERRAL SHE NEED FOR IT TO STATED THAT ON THE MRI THEY SAW A THYROID NODULE. PLEASE ADVISE.   PatientConDerick riddle  Contact Number: [phone]

## 2022-10-26 NOTE — PROGRESS NOTES
[facility] ENDOSCOPY AND OUTPATIENT  PRE-PROCEDURE INSTRUCTIONS    Procedure date_10/28/2022________Arrival time___0845_________        Procedure time_0945___________       Screening questions for Pain procedures:  Do you have a current infection? _no________  Are you currently taking an antibiotic?__no____  Are you taking a blood thinner?___no_____    It is not necessary to stop eating or drinking prior to this procedure. We would like you to take your medications for blood pressure as usual.  You may be asked to stop blood thinners such as Coumadin, Plavix, Fragmin, Lovenox, etc., or any anti-inflammatories such as:  Aspirin, Ibuprofen, Advil, Naproxen prior to your procedure. We also ask that you stop any OTC medications that cause additional bleeding    You must make arrangements for a responsible adult to arrive with you and stay in our waiting area during your procedure. They will also need to take you home after your procedure. For your safety you will not be allowed to leave alone or drive yourself home. Also for your safety, it is strongly suggested that someone stay with you the first 24 hours after your procedure. For your comfort, please wear simple loose fitting clothing to the center. Please do not bring valuables. If you have a living will and a durable power of  for healthcare, please bring in a copy.     You will need to bring a photo ID and insurance card    Our goal is to provide you with excellent care so if you have any questions, please contact us at the [facility] at [phone]

## 2022-10-28 ENCOUNTER — HOSPITAL ENCOUNTER (OUTPATIENT)
Age: 62
Setting detail: OUTPATIENT SURGERY
Discharge: HOME | End: 2022-10-28
Attending: ANESTHESIOLOGY | Admitting: ANESTHESIOLOGY
Payer: MEDICARE

## 2022-10-28 ENCOUNTER — APPOINTMENT (OUTPATIENT)
Dept: INTERVENTIONAL RADIOLOGY/VASCULAR | Age: 62
End: 2022-10-28
Attending: ANESTHESIOLOGY
Payer: MEDICARE

## 2022-10-28 VITALS
SYSTOLIC BLOOD PRESSURE: 130 MMHG | HEART RATE: 82 BPM | RESPIRATION RATE: 16 BRPM | HEIGHT: 58 IN | TEMPERATURE: 97.2 F | WEIGHT: 137 LBS | BODY MASS INDEX: 28.76 KG/M2 | DIASTOLIC BLOOD PRESSURE: 77 MMHG | OXYGEN SATURATION: 99 %

## 2022-10-28 PROCEDURE — 6360000004 HC RX CONTRAST MEDICATION: Performed by: ANESTHESIOLOGY

## 2022-10-28 PROCEDURE — 6360000002 HC RX W HCPCS: Performed by: ANESTHESIOLOGY

## 2022-10-28 PROCEDURE — 3610000054 HC PAIN LEVEL 3 BASE (NON-OR): Performed by: ANESTHESIOLOGY

## 2022-10-28 PROCEDURE — 2709999900 HC NON-CHARGEABLE SUPPLY: Performed by: ANESTHESIOLOGY

## 2022-10-28 RX ORDER — METHYLPREDNISOLONE ACETATE 80 MG/ML
INJECTION, SUSPENSION INTRA-ARTICULAR; INTRALESIONAL; INTRAMUSCULAR; SOFT TISSUE
Status: COMPLETED | OUTPATIENT
Start: 2022-10-28 | End: 2022-10-28

## 2022-10-28 ASSESSMENT — PAIN SCALES - GENERAL: PAINLEVEL_OUTOF10: 0

## 2022-10-28 ASSESSMENT — PAIN - FUNCTIONAL ASSESSMENT: PAIN_FUNCTIONAL_ASSESSMENT: 0-10

## 2022-10-28 NOTE — OP NOTE
Patient:  Dominique Moss  YOB: 1960  Medical Record #:  [id number]   Date:  10/28/2022   Physician:  Heather Morales MD    PRE-PROCEDURE DIAGNOSIS: M54.13    POST-PROCEDURE DIAGNOSIS: M54.13    PROCEDURE:  Midline interlaminar right C7-T1 epidural steroid injection with fluoroscopy and epidurography. BRIEF HISTORY:  The patient presents today to [facility] for a scheduled cervical epidural steroid injection procedure. The patient was re-evaluated today and is clinically unchanged as compared to my previous evaluation. The patient is clinically stable to proceed with the procedure. PROCEDURE NOTE:  The procedure was again explained to the patient and the previously distributed pre-procedure literature was reviewed. The options, rationale, and benefits of the procedure including pain relief, functional improvement, and increased mobility, as well as the risks of the procedure including but not limited to infection, bleeding, paresthesia, pain, failure to relieve pain, increased pain, headache, allergic reaction, neurologic impairment, local anesthetic, toxicity, and side effects and the potential side effects of corticosteroids were discussed with the patient and informed written consent was obtained from the patient. The patient was positioned in the prone position on the fluoroscopy table. The skin overlying the cervical vertebrae was prepped using Chloraprep and draped in the usual sterile fashion. The C7-T1 intervertebral level was identified using intermittent AP fluoroscopy. The previously identified projection of overlying skin was anesthetized using 2 cc of buffered 1% lidocaine with a 27 gauge needle. A 3.5\" 22g Touhy needle was advanced through a small skin nick in the AP view towards the interlaminar and epidural space. The epidural space was easily identified using loss of resistance to saline. No difficulty, paresthesia or occurrence of pain was encountered. Careful aspiration was negative for CSF and blood. A total of 1 cc Isovue 300 was injected yielding an epidurogram.    FLUOROSCOPY:  A fluoroscopy unit was utilized to obtain fluoroscopic images for intra-procedural use and assistance. Fluoroscopy was utilized to identify anatomic and radiographic landmarks for the accompanying procedure guidance and not for diagnostic purposes. After negative aspiration 4 cc of therapeutic injectate containing 1 cc of Depo-Medrol 80 mg/cc and 3 ml of saline was injected slowly in aliquots while clinically observing and monitoring the patient with negative aspiration demonstrated between aliquots of injections. At this time no paresthesia or occurrence of pain was present. The needle was then removed, the area was cleansed and a Band-Aid was placed over the injection site. There were no complications. The patient tolerated the procedure well. The procedure was performed using local anesthesia. The patient was transferred by wheelchair with accompaniment to the  Recovery Area and was monitored per protocol. The vital signs remained stable. The patient was discharged in stable condition accompanied by an escort with written instructions after fulfilling the standard discharge criteria.   Written follow up instructions were given to the patient and are as follows:    Estimated Blood Loss: 0ml    Plan:  Follow up in 6-8 weeks    Office: [phone]

## 2022-10-28 NOTE — H&P
Patient:  Casey Montgomery  YOB: 1960  Medical Record #:  [id number]   Place: [address]  Date:  10/28/2022   Physician:  Misael Landa MD    History Obtained From: electronic medical record    HISTORY OF PRESENT ILLNESS    Past Medical History:        Diagnosis Date    Back pain     Migraines     PAF (paroxysmal atrial fibrillation) ([facility] [address] 2020    Thyroid disease      Past Surgical History:        Procedure Laterality Date    APPENDECTOMY      BACK INJECTION Bilateral 09/09/2022    BILATERAL SACROILIAC JOINT INJECTION performed by Santosh Laird MD at [address] Bilateral 10/3/2022    BLEPHAROPLASTY - BILATERAL UPPER LID performed by Teresa Palomino MD at [facility] Bilateral     COLONOSCOPY      HYSTERECTOMY (CERVIX STATUS UNKNOWN)      NERVE BLOCK Left 08/05/2022    LEFT SCIATIC NERVE BLOCK performed by Santosh Laird MD at [address] Right 08/12/2022    RIGHT SCIATIC NERVE BLOCK performed by Santosh Laird MD at [address] Left 07/22/2022    LUMBAR EPIDURAL STEROID INJECTION LEFT L4-5 performed by Santosh Laird MD at [facility] 23     Medications Prior to Admission:   No current facility-administered medications on file prior to encounter. Current Outpatient Medications on File Prior to Encounter   Medication Sig Dispense Refill    gabapentin (NEURONTIN) 400 MG capsule Take 400 mg by mouth daily. traMADol (ULTRAM) 50 MG tablet Take 50 mg by mouth every 6 hours as needed for Pain.       diclofenac sodium (VOLTAREN) 1 % GEL Apply 4 g topically 4 times daily 100 g 1    buPROPion (WELLBUTRIN XL) 300 MG extended release tablet Take 300 mg by mouth every morning      clobetasol (TEMOVATE) 0.05 % ointment Apply topically 2 times daily as needed      hydrOXYzine (ATARAX) 10 MG tablet Take 10 mg by mouth every 4 hours as needed levothyroxine (SYNTHROID) 50 MCG tablet TAKE ONE TABLET BY MOUTH DAILY      LORazepam (ATIVAN) 1 MG tablet Take 1 mg by mouth every 6 hours as needed. topiramate (TOPAMAX) 100 MG tablet TAKE ONE TABLET BY MOUTH TWICE A DAY       Allergies:  Nsaids  Social History     Socioeconomic History    Marital status:      Spouse name: Not on file    Number of children: Not on file    Years of education: Not on file    Highest education level: Not on file   Occupational History    Not on file   Tobacco Use    Smoking status: Every Day     Packs/day: 0.50     Types: Cigarettes    Smokeless tobacco: Never   Vaping Use    Vaping Use: Never used   Substance and Sexual Activity    Alcohol use: Never    Drug use: Never    Sexual activity: Not Currently   Other Topics Concern    Not on file   Social History Narrative    Not on file     Social Determinants of Health     Financial Resource Strain: Not on file   Food Insecurity: Not on file   Transportation Needs: Not on file   Physical Activity: Not on file   Stress: Not on file   Social Connections: Not on file   Intimate Partner Violence: Not on file   Housing Stability: Not on file     Family History   Problem Relation Age of Onset    Diabetes Mother     High Blood Pressure Father     Heart Attack Father          PHYSICAL EXAM:      Ht 4' 10\" (1.473 m)   Wt 137 lb (62.1 kg)   BMI 28.63 kg/m²  I            ASSESSMENT AND PLAN:    1. Procedure. options, risks and benefits reviewed with patient and expresses understanding.

## 2022-10-28 NOTE — DISCHARGE INSTRUCTIONS
[facility]   [address] [place], [address] [facility] [address]   [address] [facility]  [address] [facility], [address] [place] [address]  [phone]      Patient:  Ramiro Ya  YOB: 1960  Medical Record #:  [id number]   Place: [address] [place] [address]  Date:  10/28/2022   Physician:  Shameka Mahmood MD    Procedure Performed: [unfilled]     Discharge Instructions    Notify Pain Management Services if any of the following occur:    Redness/Swelling at the injection site lasting longer than 2 days  Fever (with redness, swelling, or drainage at the injection site)  Drainage at the injection site  New weakness/ numbness  Severe headache   Loss of bowel/ bladder function    General Instructions: You may experience numbness for several hours following your treatment. You should be cautious using those areas which are numb. Once the numbness wears off, you may apply ice or heat to injection site, if needed. Do not return to work or drive today    Rest today and return to normal activities tomorrow. On average, the steroid takes about 1 week to work and can be up to 2 weeks    You should continue to depend on your primary physician for your medical management of conditions not related to your pain management treatment. Continue to take all your other medications, including blood thinners, as directed by your primary physician unless otherwise instructed. NO changes have been made to your medications. Any changes listed on the discharge are based on patient self reporting. Any EMR warnings regarding drug/drug interactions were dismissed based on current use by the patient, management by prescribing physician and pharmacist and not managed by this physician. Any problem which relates specifically to a treatment or procedure performed today should be directed to the [facility].        [facility] of Interventional Pain Management  [address] [facility], [address] [place] [address], [address]  [phone]

## 2022-11-09 NOTE — PROGRESS NOTES
[facility] ENDOSCOPY AND OUTPATIENT  PRE-PROCEDURE INSTRUCTIONS    Procedure date_11/11/2022________Arrival time___0745_________        Procedure time____0845________       Screening questions for Pain procedures:  Do you have a current infection? __no_______  Are you currently taking an antibiotic?____no__  Are you taking a blood thinner?__no______    It is not necessary to stop eating or drinking prior to this procedure. We would like you to take your medications for blood pressure as usual.  You may be asked to stop blood thinners such as Coumadin, Plavix, Fragmin, Lovenox, etc., or any anti-inflammatories such as:  Aspirin, Ibuprofen, Advil, Naproxen prior to your procedure. We also ask that you stop any OTC medications that cause additional bleeding    You must make arrangements for a responsible adult to arrive with you and stay in our waiting area during your procedure. They will also need to take you home after your procedure. For your safety you will not be allowed to leave alone or drive yourself home. Also for your safety, it is strongly suggested that someone stay with you the first 24 hours after your procedure. For your comfort, please wear simple loose fitting clothing to the center. Please do not bring valuables. If you have a living will and a durable power of  for healthcare, please bring in a copy.     You will need to bring a photo ID and insurance card    Our goal is to provide you with excellent care so if you have any questions, please contact us at the [facility] at [phone]

## 2022-11-11 ENCOUNTER — APPOINTMENT (OUTPATIENT)
Dept: INTERVENTIONAL RADIOLOGY/VASCULAR | Age: 62
End: 2022-11-11
Attending: ANESTHESIOLOGY
Payer: MEDICARE

## 2022-11-11 ENCOUNTER — HOSPITAL ENCOUNTER (OUTPATIENT)
Age: 62
Setting detail: OUTPATIENT SURGERY
Discharge: HOME | End: 2022-11-11
Attending: ANESTHESIOLOGY | Admitting: ANESTHESIOLOGY
Payer: MEDICARE

## 2022-11-11 VITALS
HEIGHT: 58 IN | DIASTOLIC BLOOD PRESSURE: 67 MMHG | TEMPERATURE: 97.1 F | WEIGHT: 136.2 LBS | OXYGEN SATURATION: 97 % | RESPIRATION RATE: 16 BRPM | SYSTOLIC BLOOD PRESSURE: 103 MMHG | BODY MASS INDEX: 28.59 KG/M2 | HEART RATE: 68 BPM

## 2022-11-11 PROCEDURE — 3610000056 HC PAIN LEVEL 4 BASE (NON-OR): Performed by: ANESTHESIOLOGY

## 2022-11-11 PROCEDURE — 6360000002 HC RX W HCPCS: Performed by: ANESTHESIOLOGY

## 2022-11-11 PROCEDURE — 2500000003 HC RX 250 WO HCPCS: Performed by: ANESTHESIOLOGY

## 2022-11-11 PROCEDURE — 2709999900 HC NON-CHARGEABLE SUPPLY: Performed by: ANESTHESIOLOGY

## 2022-11-11 RX ORDER — METHYLPREDNISOLONE ACETATE 80 MG/ML
INJECTION, SUSPENSION INTRA-ARTICULAR; INTRALESIONAL; INTRAMUSCULAR; SOFT TISSUE
Status: COMPLETED | OUTPATIENT
Start: 2022-11-11 | End: 2022-11-11

## 2022-11-11 RX ORDER — BUPIVACAINE HYDROCHLORIDE 5 MG/ML
INJECTION, SOLUTION EPIDURAL; INTRACAUDAL
Status: COMPLETED | OUTPATIENT
Start: 2022-11-11 | End: 2022-11-11

## 2022-11-11 RX ORDER — LIDOCAINE HYDROCHLORIDE 10 MG/ML
INJECTION, SOLUTION EPIDURAL; INFILTRATION; INTRACAUDAL; PERINEURAL
Status: COMPLETED | OUTPATIENT
Start: 2022-11-11 | End: 2022-11-11

## 2022-11-11 ASSESSMENT — PAIN DESCRIPTION - DESCRIPTORS
DESCRIPTORS: ACHING
DESCRIPTORS: BURNING

## 2022-11-11 ASSESSMENT — PAIN DESCRIPTION - ORIENTATION: ORIENTATION: RIGHT

## 2022-11-11 ASSESSMENT — PAIN DESCRIPTION - LOCATION: LOCATION: HIP;LEG

## 2022-11-11 ASSESSMENT — PAIN DESCRIPTION - FREQUENCY: FREQUENCY: INTERMITTENT

## 2022-11-11 ASSESSMENT — PAIN - FUNCTIONAL ASSESSMENT: PAIN_FUNCTIONAL_ASSESSMENT: 0-10

## 2022-11-11 ASSESSMENT — PAIN SCALES - GENERAL: PAINLEVEL_OUTOF10: 3

## 2022-11-11 NOTE — OP NOTE
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 The patient is clinically unchanged as compared to my previous evaluation. The patient is clinically stable to proceed with the above scheduled injection. The options, rationale and benefits were discussed as were the risks including but not limited to infection, bleeding, pain, failure to relieve pain and pneumothorax. Informed written consent was obtained. PROCEDURE NOTE:  Patient was placed in the prone position. The Bilateral posterior shoulder area was prepped with Choraprep and draped in the usual sterile fashion. Using intermittent fluoroscopic guidance a 25 gauge 3 1/2 inch needle was placed in the supraspinatus fossa between the suprascapular notch and scapular spine until periosteum was contacted. Periosteum was contacted. Negative aspiration was demonstrated and 10 cc of therapeutic injectate was injected and the needle was removed. The therapeutic injectate contained 9 cc of  bupivacaine 0.25 % and 1 cc of Depo-Medrol 40 mg per cc. The injectate was given in small aliquots with negative aspiration between aliquots. The needle was removed. The area was cleansed. A Band-Aid was placed over the injection site. The patient tolerated the procedure well and had no difficulty.   The patient was transported by wheelchair with accompaniment to the recovery area and was discharged following standard discharge criteria.     Estimated Blood Loss: 0ml      Plan:  Follow up in 4-6 weeks    Office: [phone]

## 2022-11-11 NOTE — DISCHARGE INSTRUCTIONS
[facility]   [address] [place], [address] [place] [address]   [address], [address] [place] [address]  [phone]      Patient:  Malcom Gracia  YOB: 1960  Medical Record #:  [id number]   Place: [address] [place] [address]  Date:  11/11/2022   Physician:  Jerome Cordova MD    Procedure Performed: [unfilled]     Discharge Instructions    Notify Pain Management Services if any of the following occur:    Redness/Swelling at the injection site lasting longer than 2 days  Fever (with redness, swelling, or drainage at the injection site)  Drainage at the injection site  New weakness/ numbness  Severe headache   Loss of bowel/ bladder function    General Instructions: You may experience numbness for several hours following your treatment. You should be cautious using those areas which are numb. Once the numbness wears off, you may apply ice or heat to injection site, if needed. Do not return to work or drive today    Rest today and return to normal activities tomorrow. On average, the steroid takes about 1 week to work and can be up to 2 weeks    You should continue to depend on your primary physician for your medical management of conditions not related to your pain management treatment. Continue to take all your other medications, including blood thinners, as directed by your primary physician unless otherwise instructed. NO changes have been made to your medications. Any changes listed on the discharge are based on patient self reporting. Any EMR warnings regarding drug/drug interactions were dismissed based on current use by the patient, management by prescribing physician and pharmacist and not managed by this physician. Any problem which relates specifically to a treatment or procedure performed today should be directed to the [facility].        Riley Everett of Inter[facility]  [address] [place], [place] [address] [facility] [address], [address] [place] [address]  [phone]

## 2022-11-15 ENCOUNTER — OFFICE VISIT (OUTPATIENT)
Dept: ENDOCRINOLOGY | Age: 62
End: 2022-11-15
Payer: MEDICARE

## 2022-11-15 VITALS
DIASTOLIC BLOOD PRESSURE: 69 MMHG | RESPIRATION RATE: 14 BRPM | BODY MASS INDEX: 29.18 KG/M2 | TEMPERATURE: 98 F | HEIGHT: 58 IN | SYSTOLIC BLOOD PRESSURE: 115 MMHG | HEART RATE: 83 BPM | WEIGHT: 139 LBS

## 2022-11-15 DIAGNOSIS — E04.1 THYROID NODULE: Primary | ICD-10-CM

## 2022-11-15 PROCEDURE — G8427 DOCREV CUR MEDS BY ELIG CLIN: HCPCS | Performed by: INTERNAL MEDICINE

## 2022-11-15 PROCEDURE — 3017F COLORECTAL CA SCREEN DOC REV: CPT | Performed by: INTERNAL MEDICINE

## 2022-11-15 PROCEDURE — 4004F PT TOBACCO SCREEN RCVD TLK: CPT | Performed by: INTERNAL MEDICINE

## 2022-11-15 PROCEDURE — 99204 OFFICE O/P NEW MOD 45 MIN: CPT | Performed by: INTERNAL MEDICINE

## 2022-11-15 PROCEDURE — G8484 FLU IMMUNIZE NO ADMIN: HCPCS | Performed by: INTERNAL MEDICINE

## 2022-11-15 PROCEDURE — G8417 CALC BMI ABV UP PARAM F/U: HCPCS | Performed by: INTERNAL MEDICINE

## 2022-11-15 NOTE — PROGRESS NOTES
Subjective:      64 y/o WF who is here for thyroid evaluation. Referred by Dr. Valdovinos Labrum on MRI    9/22 MRI    SOFT TISSUES: Partially imaged right thyroid nodule measures at least 2.1 cm   in diameter.      Current complaints: hoarse  History of obstructive symptoms:  difficulty swallowing No, changes in voice/hoarseness  No.    Reports goiter 12 years ago, was started on levothyroxine , goiter resolved  Also states it was aspirated, fluid removed    On levothyroxine 50mcg    History of radiation to patient's neck: no  Family history includes sister thyroid problem  Family history of thyroid cancer:  no    2/22 TSH 2.23    Mild, controlled  No worsening factors    Past Medical History:   Diagnosis Date    Back pain     Migraines     PAF (paroxysmal atrial fibrillation) ([facility] [address] 2020    Thyroid disease      Past Surgical History:   Procedure Laterality Date    APPENDECTOMY      BACK INJECTION Bilateral 09/09/2022    BILATERAL SACROILIAC JOINT INJECTION performed by Donna Higuera MD at [address] Bilateral 10/3/2022    BLEPHAROPLASTY - BILATERAL UPPER LID performed by Brie Bowles MD at [facility] Bilateral     COLONOSCOPY      HYSTERECTOMY (CERVIX STATUS UNKNOWN)      NERVE BLOCK Left 08/05/2022    LEFT SCIATIC NERVE BLOCK performed by Donna Higuera MD at [address] Right 08/12/2022    RIGHT SCIATIC NERVE BLOCK performed by Donna Higuera MD at [address] Left 07/22/2022    LUMBAR EPIDURAL STEROID INJECTION LEFT L4-5 performed by Donna Higuera MD at [address] Right 10/28/2022    CERVICAL EPIDURAL STEROID INJECTION RIGHT C7-T1 performed by Donna Higuera MD at [address] Bilateral 11/11/2022    BILATERAL SUPRASCAPULAR NERVE BLOCK performed by Donna Higuera MD at [facility]     Current Outpatient Medications   Medication Sig Dispense Refill    gabapentin (NEURONTIN) 400 MG capsule Take 400 mg by mouth daily. 600 mg QHS      traMADol (ULTRAM) 50 MG tablet Take 50 mg by mouth every 6 hours as needed for Pain. diclofenac sodium (VOLTAREN) 1 % GEL Apply 4 g topically 4 times daily 100 g 1    clobetasol (TEMOVATE) 0.05 % ointment Apply topically 2 times daily as needed      levothyroxine (SYNTHROID) 50 MCG tablet TAKE ONE TABLET BY MOUTH DAILY      topiramate (TOPAMAX) 100 MG tablet TAKE ONE TABLET BY MOUTH TWICE A DAY       No current facility-administered medications for this visit. Review of Systems  Please see scanned     Objective: There were no vitals taken for this visit. Wt Readings from Last 3 Encounters:   11/11/22 136 lb 3.2 oz (61.8 kg)   10/28/22 137 lb (62.1 kg)   10/03/22 137 lb (62.1 kg)       Constitutional: Well-developed, appears stated age, cooperative, in no acute distress  H/E/N/M/T:atraumatic, normocephalic, external ears, nose, lips normal without lesions  No facial puffiness, no hoarseness    Eyes: Lids, lashes, conjunctivae and sclerae normal, No proptosis, no lid lag, no stare, no periorbital edema, extraocular movements intact. Neck: supple, symmetrical, trachea midline   Thyroid: gland size normal, non-tender on palpation  Respiratory: clear to auscultation bilaterally and breathing is unlabored  Cardiovascular: regular rate and rhythm, S1, S2, regular rate and rhythm, no murmur, rub or gallop. Skin: No obvious rashes or lesions present. Skin and hair texture normal  Psychiatric: Judgement and Insight:  judgement and insight appear normal  Neuro: Normal without focal findings, speech is normal normal, speech is spontaneous, and movements are coordinated, alert and oriented x3    Lab Review  No results found for: TSH  No results found for: FREET4      Assessment:     Thyroid Nodule:  Incidental 2.1cm right sided nodule on MRI. Reports h/o thyroid cyst in the past? Will get ultrasound of the thyroid. Discussed indications of FNA. If dominant solid nodule, will need FNA    Hypothyroidism: Last TSH at goal, on levothyroxine       Plan: 1. Order thyroid USG  2. Levothyroxine 50mcg daily

## 2022-11-28 ENCOUNTER — HOSPITAL ENCOUNTER (OUTPATIENT)
Dept: ULTRASOUND IMAGING | Age: 62
Discharge: HOME | End: 2022-11-28
Payer: MEDICARE

## 2022-11-28 DIAGNOSIS — E04.1 THYROID NODULE: ICD-10-CM

## 2022-11-28 PROCEDURE — 76536 US EXAM OF HEAD AND NECK: CPT

## 2022-11-29 NOTE — PROGRESS NOTES
FINDINGS:  Right thyroid lobe:  5.1 cm x 3.5 cm x 3.0 cm     Left thyroid lobe:  4.5 cm x 1.6 cm x 2.2 cm     Isthmus:  0.2 cm     THYROID GLAND:  Moderate enlargement of the right lobe due to the below  nodule with otherwise normal size. Normal echogenicity and echotexture. Increased vascularity associated with the below nodule with otherwise normal  background parenchymal vascularity. NODULES:  As below.      - Nodule 1:  4.0 cm x 3.6 cm x 2.8 cm (3.5 cm x 2.8 cm x 1.7 cm on  12/08/2007), right lobe, almost completely solid (2), isoechoic (1),  wider-than-tall (0), ill-defined margins (0), macrocalcifications (1), TR4,  biopsy on 12/08/2007    H/o benign FNA in 2007    Recommend FNA

## 2022-12-06 ENCOUNTER — OFFICE VISIT (OUTPATIENT)
Dept: ORTHOPEDIC SURGERY | Age: 62
End: 2022-12-06

## 2022-12-06 VITALS — WEIGHT: 139 LBS | HEIGHT: 58 IN | BODY MASS INDEX: 29.18 KG/M2

## 2022-12-06 DIAGNOSIS — M22.41 CHONDROMALACIA OF BOTH PATELLAE: Primary | ICD-10-CM

## 2022-12-06 DIAGNOSIS — M22.42 CHONDROMALACIA OF BOTH PATELLAE: Primary | ICD-10-CM

## 2022-12-06 DIAGNOSIS — M25.562 ACUTE PAIN OF BOTH KNEES: ICD-10-CM

## 2022-12-06 DIAGNOSIS — M25.561 ACUTE PAIN OF BOTH KNEES: ICD-10-CM

## 2022-12-06 DIAGNOSIS — M65.9 SYNOVITIS OF BOTH KNEE JOINTS: ICD-10-CM

## 2022-12-06 RX ORDER — LIDOCAINE HYDROCHLORIDE 10 MG/ML
2 INJECTION, SOLUTION INFILTRATION; PERINEURAL ONCE
Status: COMPLETED | OUTPATIENT
Start: 2022-12-06 | End: 2022-12-06

## 2022-12-06 RX ORDER — BETAMETHASONE SODIUM PHOSPHATE AND BETAMETHASONE ACETATE 3; 3 MG/ML; MG/ML
24 INJECTION, SUSPENSION INTRA-ARTICULAR; INTRALESIONAL; INTRAMUSCULAR; SOFT TISSUE ONCE
Status: COMPLETED | OUTPATIENT
Start: 2022-12-06 | End: 2022-12-06

## 2022-12-06 RX ORDER — BUPIVACAINE HYDROCHLORIDE 2.5 MG/ML
4 INJECTION, SOLUTION INFILTRATION; PERINEURAL ONCE
Status: COMPLETED | OUTPATIENT
Start: 2022-12-06 | End: 2022-12-06

## 2022-12-06 RX ADMIN — LIDOCAINE HYDROCHLORIDE 2 ML: 10 INJECTION, SOLUTION INFILTRATION; PERINEURAL at 09:49

## 2022-12-06 RX ADMIN — BUPIVACAINE HYDROCHLORIDE 10 MG: 2.5 INJECTION, SOLUTION INFILTRATION; PERINEURAL at 09:48

## 2022-12-06 RX ADMIN — BETAMETHASONE SODIUM PHOSPHATE AND BETAMETHASONE ACETATE 24 MG: 3; 3 INJECTION, SUSPENSION INTRA-ARTICULAR; INTRALESIONAL; INTRAMUSCULAR; SOFT TISSUE at 09:47

## 2022-12-06 NOTE — PROGRESS NOTES
Chief Complaint  Knee Pain (Bilateral Knee Pain)      Evaluation recurrent right greater than left anterior knee pain with mild swelling and history of multiple joint arthralgias followed by Dr. Pancho Stone in rheumatology    History of Present Illness:  Michelle Hutchins is a 65 y.o. female who is a very pleasant white female recreational walker who is an avid  and is a very nice patient of Dr. James Sinha who is being seen today in kind consultation from Dr. James Sinha for evaluation of ongoing pain to the anterior portion of her knees bilaterally. She states that since summer 2020, she began to notice increasing pain with crepitation and popping to the anterior portion of her knees. There is no history of actual injury or new activity prior to becoming symptomatic and she is having pain ranging between a 2-5 out of 10 involving the knee. The right primarily is worse than the left but is not really associated with pseudobuckling and she has not noticed locking or catching or popping. At rest she will have a dull achy pain at 2 out of 10 but will have sharp for 4-5 out of 10 pain so she is squatting kneeling such as gardening and with reading and does have pain with positional changes and stairs. She was recently switched off Celebrex by Dr. Pancho Stone to try meloxicam but believes the Celebrex was working much better for her. She is being seen today for orthopedic and sports consultation with initial weightbearing imaging. Lauren Mckay was initially evaluated in the office for her knees on 8/19/2021 was found to have primarily symptomatic patellofemoral arthropathy with lower grades of weightbearing osteoarthritis. With initial treatment she did not standing up until the end of May 2022 when she began noticed worsening pain once again to her knees right equal to left.   There is no interim history of injury or no activity although she has been battling problems with disc herniations and radiculopathy and believes she has been altering her gait which may be the cause of her recurrent symptoms. She has a hard time performing her patella protection program as it may irritate her back and she is going to be receiving an epidural with Dr. Shameka Mahmood on 7/15/2022. She apparently did have some elevations in her renal functions and had to stop the Celebrex but apparently her renal functions have stabilized and has been using primarily Biofreeze. Denies locking catching or true instability symptoms with most of her pain being anterior in nature and difficulties been noted with distance walking positional changes going up and down stairs. Occasional night pain noted. No active locking or catching. With I saw Dougie Catalan in the office on 6/30/2022 for her underlying symptomatic bilateral knee patellofemoral arthropathy with lesser degrees of weightbearing osteoarthritis. With treatment this past summer she was doing very well but states that over the last few weeks with the weather getting colder she has been a little bit more sore. She has been using her Voltaren gel tries to work on her exercise and stretching program.  They are going to be leaving for [place] in early January and will spill her several months there. Denies locking catching or true instability symptoms and is trying to work on her stretching and strengthening program.  Pain was doing very well up until roughly mid November 2022 and is now range between a 3-5 out of 10 but fortunately no rest or night pain is been noted. Distance walking stairclimbing are somewhat problematic for her. He has been using her Voltaren gel. Medical History     Patient's medications, allergies, past medical, surgical, social and family histories were reviewed and updated as appropriate. Review of Systems  Pertinent items are noted in HPI  Review of systems reviewed from Patient History Form dated on 8/19/2021 and available in the patient's chart under the Media tab. Vital Signs  There were no vitals filed for this visit. General Exam:     Constitutional: Patient is adequately groomed with no evidence of malnutrition  DTRs: Deep tendon reflexes are intact  Mental Status: The patient is oriented to time, place and person. The patient's mood and affect are appropriate. Lymphatic: The lymphatic examination bilaterally reveals all areas to be without enlargement or induration. Vascular: Examination reveals no swelling or calf tenderness. Peripheral pulses are palpable and 2+. Neurological: The patient has good coordination. There is no weakness or sensory deficit. Knee Examination  Inspection: There is no high-grade deformity or malalignments. She is at best only trace knee joint effusions and does have some patellofemoral crepitation. Palpation: She does have tenderness over the medial and lateral patellofemoral facet with patellar grind testing reproducing majority of her symptoms although she does have some very mild posterior medial joint line tenderness right greater than left. Rang of Motion: Full active and passive range of motion with mild tightness to hamstrings. Fair quad tone. Strength: Plus out of 5 with flexion extension. Special Tests: Recurrent positive patellar grind testing reproducing majority of her symptoms. No high-grade clicks and only mild worsening of pain with medial Lana's bilaterally. I do not sense high-grade instability and screening hip testing is benign. Skin: There are no rashes, ulcerations or lesions. Distal neurovascular exam is intact. Gait: Fluid smooth gait    Reflex symmetrically preserved    Additional Comments:     Additional Examinations:  Right Lower Extremity: Examination of the right lower extremity does not show any tenderness, deformity or injury. Range of motion is unremarkable. There is no gross instability. There are no rashes, ulcerations or lesions.   Strength and tone are normal.  Left Lower Extremity: Examination of the left lower extremity does not show any tenderness, deformity or injury. Range of motion is unremarkable. There is no gross instability. There are no rashes, ulcerations or lesions. Strength and tone are normal.      Diagnostic Test Findings: Bilateral knee AP and PA weightbearing sunrise and lateral films reviewed from 6/30/2022 does show relatively good maintenance of articular surface heights with mild tilt on sunrise view without high-grade patellofemoral arthropathy      Assessment : #1.  3-4 Weeks status post recurrent  symptomatic bilateral knee symptomatic chondromalacia patella with recurrent knee synovitis and knee pain    Impression:  Encounter Diagnoses   Name Primary? Chondromalacia of both patellae Yes    Synovitis of both knee joints     Acute pain of both knees        Office Procedures:  Orders Placed This Encounter   Procedures    DC ARTHROCENTESIS ASPIR&/INJ MAJOR JT/BURSA W/O US       Treatment Plan:  Treatment options were discussed with Twyla Garcia. We did review her updated plain films and exam findings. I suspect we are dealing primarily with recurrence of her right greater than left knee chondromalacia patella with maltracking. She also sees rheumatology for multiple joint arthralgias. With her worsening pain once again and difficulty walking but is changing positions going up and down stairs combined with them leaving for [place] for several months in early January, we did repeat her intra-articular steroid injections today bilaterally using 2 cc of Celestone, 2 cc of Marcaine, 1 cc of Xylocaine. This had previously given her about 1 5+ months of pain relief we did review her patella protection program although she may need to modify these with her radicular symptoms and back pain. She she did have an epidural on 7/15/2022 and we will have her start Voltaren gel 4 g 4 times daily with her history of some renal dysfunction which did correct. We did have a long discussion regarding considering viscosupplementation as the majority of her pain seems more anterior in nature. She will think about this and let us know. Otherwise we can see her back in the spring when they return to town. She will contact us in the interim with questions or concerns     CC: Dr. Clarice Fuentes      This dictation was performed with a verbal recognition program [facility]) and it was checked for errors. It is possible that there are still dictated errors within this office note. If so, please bring any errors to my attention for an addendum. All efforts were made to ensure that this office note is accurate.

## 2022-12-08 ENCOUNTER — HOSPITAL ENCOUNTER (OUTPATIENT)
Dept: GENERAL RADIOLOGY | Age: 62
Discharge: HOME | End: 2022-12-08
Payer: MEDICARE

## 2022-12-08 ENCOUNTER — HOSPITAL ENCOUNTER (OUTPATIENT)
Age: 62
Discharge: HOME | End: 2022-12-08
Payer: MEDICARE

## 2022-12-08 DIAGNOSIS — M25.551 RIGHT HIP PAIN: ICD-10-CM

## 2022-12-08 PROCEDURE — 73502 X-RAY EXAM HIP UNI 2-3 VIEWS: CPT

## 2022-12-12 ENCOUNTER — HOSPITAL ENCOUNTER (OUTPATIENT)
Dept: ULTRASOUND IMAGING | Age: 62
Discharge: HOME | End: 2022-12-12
Payer: MEDICARE

## 2022-12-12 DIAGNOSIS — E04.1 THYROID NODULE: ICD-10-CM

## 2022-12-12 PROCEDURE — 88173 CYTOPATH EVAL FNA REPORT: CPT

## 2022-12-12 PROCEDURE — 60100 BIOPSY OF THYROID: CPT

## 2022-12-12 PROCEDURE — 88305 TISSUE EXAM BY PATHOLOGIST: CPT

## 2022-12-12 NOTE — DISCHARGE INSTRUCTIONS
Edy Carvalho  [address]  Telephone: [phone]      PATIENT NAME: Kyle Garcia  MEDICAL RECORD NUMBER:  [id number]  TODAY'S DATE: @ED@    Discharge Instructions - Post Thyroid Biopsy    [x] Place a cold pack  on top of the dressing for at least 3 hours removing it every 20 minutes for 5 minutes after your biopsy. [x] Do not take Aspirin the day of your biopsy. [x] Your physician has instructed you to take Tylenol (Acetaminophen) the day of your         biopsy for any discomfort. [x] Watch for excessive bleeding or pain. If this occurs call Dr. Chapo Mcdaniel   Some minor swelling and discomfort is to be expected. You may take Tylenol or Advil if needed. If the swelling increases or you have difficulty swallowing / talking - proceed to the closest emergency room. [x] Do not participate in any strenuous exercise for 48 hours after your biopsy, such as         tennis, aerobics, weight lifting, etc.    [x] You may remove your dressing tomorrow and shower. Your physician will receive a report within 2-3 business days. Please contact the office for results.

## 2022-12-20 ENCOUNTER — TELEPHONE (OUTPATIENT)
Dept: ENDOCRINOLOGY | Age: 62
End: 2022-12-20

## 2023-04-04 ENCOUNTER — OFFICE VISIT (OUTPATIENT)
Dept: ORTHOPEDIC SURGERY | Age: 63
End: 2023-04-04

## 2023-04-04 DIAGNOSIS — M25.561 ACUTE PAIN OF BOTH KNEES: ICD-10-CM

## 2023-04-04 DIAGNOSIS — M17.0 PRIMARY OSTEOARTHRITIS OF BOTH KNEES: ICD-10-CM

## 2023-04-04 DIAGNOSIS — M65.9 SYNOVITIS OF BOTH KNEE JOINTS: ICD-10-CM

## 2023-04-04 DIAGNOSIS — M25.562 ACUTE PAIN OF BOTH KNEES: ICD-10-CM

## 2023-04-04 DIAGNOSIS — M22.42 CHONDROMALACIA OF BOTH PATELLAE: Primary | ICD-10-CM

## 2023-04-04 DIAGNOSIS — M22.41 CHONDROMALACIA OF BOTH PATELLAE: Primary | ICD-10-CM

## 2023-04-04 RX ORDER — BUPIVACAINE HYDROCHLORIDE 2.5 MG/ML
4 INJECTION, SOLUTION INFILTRATION; PERINEURAL ONCE
Status: COMPLETED | OUTPATIENT
Start: 2023-04-04 | End: 2023-04-04

## 2023-04-04 RX ORDER — LIDOCAINE HYDROCHLORIDE 10 MG/ML
2 INJECTION, SOLUTION INFILTRATION; PERINEURAL ONCE
Status: COMPLETED | OUTPATIENT
Start: 2023-04-04 | End: 2023-04-04

## 2023-04-04 RX ORDER — BETAMETHASONE SODIUM PHOSPHATE AND BETAMETHASONE ACETATE 3; 3 MG/ML; MG/ML
24 INJECTION, SUSPENSION INTRA-ARTICULAR; INTRALESIONAL; INTRAMUSCULAR; SOFT TISSUE ONCE
Status: COMPLETED | OUTPATIENT
Start: 2023-04-04 | End: 2023-04-04

## 2023-04-04 RX ADMIN — BUPIVACAINE HYDROCHLORIDE 10 MG: 2.5 INJECTION, SOLUTION INFILTRATION; PERINEURAL at 10:03

## 2023-04-04 RX ADMIN — LIDOCAINE HYDROCHLORIDE 2 ML: 10 INJECTION, SOLUTION INFILTRATION; PERINEURAL at 10:03

## 2023-04-04 RX ADMIN — BETAMETHASONE SODIUM PHOSPHATE AND BETAMETHASONE ACETATE 24 MG: 3; 3 INJECTION, SUSPENSION INTRA-ARTICULAR; INTRALESIONAL; INTRAMUSCULAR; SOFT TISSUE at 10:02

## 2023-04-04 NOTE — LETTER
April 4, 2023      Reynaldo Mohr MD  [address] [facility] [address]      Patient: Simona Garcia   MR Number: [id number]   YOB: 1960   Date of Visit: 4/4/2023       Dear Reynaldo Mohr: Thank you for referring Samantha Garcia to me for evaluation/treatment. Below are the relevant portions of my assessment and plan of care. If you have questions, please do not hesitate to call me. I look forward to following Samantha Magana along with you.     Sincerely,        Jl Fernandez MD

## 2023-04-04 NOTE — PROGRESS NOTES
Treatment Plan:  Treatment options were discussed with Twyla Garcia. We did review her updated plain films and exam findings. I suspect we are dealing primarily with recurrence of her right greater than left knee chondromalacia patella with maltracking. She also sees rheumatology for multiple joint arthralgias. With her worsening pain once again and difficulty walking but is changing positions going up and down stairs for several weeks and mid March 2023, we did repeat her intra-articular steroid injections today bilaterally using 2 cc of Celestone, 2 cc of Marcaine, 1 cc of Xylocaine. This had previously given her about 3+ months of pain relief we did review her patella protection program although she may need to modify these with her radicular symptoms and back pain. She she did have an epidural on 7/15/2022 and we will have her start Voltaren gel 4 g 4 times daily with her history of some renal dysfunction which did correct. We did have a long discussion regarding considering viscosupplementation as the majority of her pain seems more anterior in nature. She will think about this and let us know. She will contact us in the interim with questions or concerns. CC: Dr. Arnaldo Cespedes      This dictation was performed with a verbal recognition program [facility] ) and it was checked for errors. It is possible that there are still dictated errors within this office note. If so, please bring any errors to my attention for an addendum. All efforts were made to ensure that this office note is accurate.

## 2023-04-18 ENCOUNTER — OFFICE VISIT (OUTPATIENT)
Dept: ORTHOPEDIC SURGERY | Age: 63
End: 2023-04-18

## 2023-04-18 VITALS — BODY MASS INDEX: 29.18 KG/M2 | HEIGHT: 58 IN | WEIGHT: 139 LBS

## 2023-04-18 DIAGNOSIS — M65.9 SYNOVITIS OF BOTH KNEE JOINTS: ICD-10-CM

## 2023-04-18 DIAGNOSIS — M22.41 CHONDROMALACIA OF BOTH PATELLAE: Primary | ICD-10-CM

## 2023-04-18 DIAGNOSIS — M25.562 ACUTE PAIN OF BOTH KNEES: ICD-10-CM

## 2023-04-18 DIAGNOSIS — M25.561 ACUTE PAIN OF BOTH KNEES: ICD-10-CM

## 2023-04-18 DIAGNOSIS — M17.0 PRIMARY OSTEOARTHRITIS OF BOTH KNEES: ICD-10-CM

## 2023-04-18 DIAGNOSIS — M22.42 CHONDROMALACIA OF BOTH PATELLAE: Primary | ICD-10-CM

## 2023-04-18 NOTE — PROGRESS NOTES
symptomatic chondromalacia patella with bilateral knee Orthovisc No. 1     Impression:  Encounter Diagnoses   Name Primary? Chondromalacia of both patellae Yes    Synovitis of both knee joints     Acute pain of both knees     Primary osteoarthritis of both knees        Office Procedures:  No orders of the defined types were placed in this encounter. Treatment Plan:  Treatment options were discussed with Twyla Garcia. We did review her updated plain films and exam findings. I suspect we are dealing primarily with recurrence of her right greater than left knee chondromalacia patella with maltracking. She also sees rheumatology for multiple joint arthralgias. She is feeling much better at this point with much less pain for the most part her pain is only 1-2 out of 10. After discussion of pros and cons of viscosupplementation, she did receive her first injection of Orthovisc to her knees bilaterally. This was performed using a standard prefilled 2 cc syringe. We previously reviewed her patella protection program and encouraged her to keep up with this and she will continue with her periodic knee bracing as well as her Voltaren gel 4 g 4 times daily to her knees she does have a history of renal dysfunction in the past.  We will see her back each in the next 2 weeks to finish up her initial Orthovisc series. Icing and activity modification was discussed. She will contact us in the interim with questions or concerns. CC: Dr. Magan Daniels      This dictation was performed with a verbal recognition program [facility]) and it was checked for errors. It is possible that there are still dictated errors within this office note. If so, please bring any errors to my attention for an addendum. All efforts were made to ensure that this office note is accurate.

## 2023-04-25 ENCOUNTER — OFFICE VISIT (OUTPATIENT)
Dept: ORTHOPEDIC SURGERY | Age: 63
End: 2023-04-25
Payer: MEDICARE

## 2023-04-25 DIAGNOSIS — M25.562 ACUTE PAIN OF BOTH KNEES: ICD-10-CM

## 2023-04-25 DIAGNOSIS — M22.41 CHONDROMALACIA OF BOTH PATELLAE: Primary | ICD-10-CM

## 2023-04-25 DIAGNOSIS — M25.561 ACUTE PAIN OF BOTH KNEES: ICD-10-CM

## 2023-04-25 DIAGNOSIS — M17.0 PRIMARY OSTEOARTHRITIS OF BOTH KNEES: ICD-10-CM

## 2023-04-25 DIAGNOSIS — M22.42 CHONDROMALACIA OF BOTH PATELLAE: Primary | ICD-10-CM

## 2023-04-25 PROCEDURE — 20610 DRAIN/INJ JOINT/BURSA W/O US: CPT | Performed by: FAMILY MEDICINE

## 2023-04-25 PROCEDURE — 99999 PR OFFICE/OUTPT VISIT,PROCEDURE ONLY: CPT | Performed by: FAMILY MEDICINE

## 2023-04-25 NOTE — PROGRESS NOTES
[facility]  PRE-PROCEDURE INSTRUCTIONS    Procedure date_04/28/2023________Arrival time____0730________        Procedure time____0830________       Screening questions for Pain procedures:  Do you have a current infection? ___N______  Are you currently taking an antibiotic?__N____  Are you taking a blood thinner?___N_____    It is not necessary to stop eating or drinking prior to this procedure. We would like you to take your medications for blood pressure as usual.  You may be asked to stop blood thinners such as Coumadin, Plavix, Fragmin, Lovenox, etc., or any anti-inflammatories such as:  Aspirin, Ibuprofen, Advil, Naproxen prior to your procedure. We also ask that you stop any OTC medications that cause additional bleeding    You must make arrangements for a responsible adult to arrive with you and stay in our waiting area during your procedure. They will also need to take you home after your procedure. For your safety you will not be allowed to leave alone or drive yourself home. Also for your safety, it is strongly suggested that someone stay with you the first 24 hours after your procedure. For your comfort, please wear simple loose fitting clothing to the center. Please do not bring valuables. If you have a living will and a durable power of  for healthcare, please bring in a copy.     You will need to bring a photo ID and insurance card    Our goal is to provide you with excellent care so if you have any questions, please contact us at the [facility] at [phone]

## 2023-04-25 NOTE — PROGRESS NOTES
TO PATIENT: The patient was advised to ice the knee for 15-20 minutes to relieve any injection site related pain. FOLLOW-UP: as directed. She will continue with her bracing and home-based exercise program.  She will continue with her Voltaren gel episodic use of her knee brace. We will see her back next week to finish up her initial Orthovisc injection series. She is aware it may take 6 to 8 weeks to see maximum efficacy.   She will contact us in the interim with questions or concerns

## 2023-04-28 ENCOUNTER — HOSPITAL ENCOUNTER (OUTPATIENT)
Age: 63
Setting detail: OUTPATIENT SURGERY
Discharge: HOME | End: 2023-04-28
Attending: ANESTHESIOLOGY | Admitting: ANESTHESIOLOGY
Payer: MEDICARE

## 2023-04-28 ENCOUNTER — APPOINTMENT (OUTPATIENT)
Dept: INTERVENTIONAL RADIOLOGY/VASCULAR | Age: 63
End: 2023-04-28
Attending: ANESTHESIOLOGY
Payer: MEDICARE

## 2023-04-28 VITALS
HEART RATE: 68 BPM | OXYGEN SATURATION: 98 % | DIASTOLIC BLOOD PRESSURE: 78 MMHG | HEIGHT: 58 IN | SYSTOLIC BLOOD PRESSURE: 114 MMHG | BODY MASS INDEX: 28.55 KG/M2 | TEMPERATURE: 97.6 F | WEIGHT: 136 LBS | RESPIRATION RATE: 16 BRPM

## 2023-04-28 PROCEDURE — 3610000054 HC PAIN LEVEL 3 BASE (NON-OR): Performed by: ANESTHESIOLOGY

## 2023-04-28 PROCEDURE — 6360000002 HC RX W HCPCS: Performed by: ANESTHESIOLOGY

## 2023-04-28 PROCEDURE — 2709999900 HC NON-CHARGEABLE SUPPLY: Performed by: ANESTHESIOLOGY

## 2023-04-28 PROCEDURE — 6360000004 HC RX CONTRAST MEDICATION: Performed by: ANESTHESIOLOGY

## 2023-04-28 RX ORDER — METHYLPREDNISOLONE ACETATE 80 MG/ML
INJECTION, SUSPENSION INTRA-ARTICULAR; INTRALESIONAL; INTRAMUSCULAR; SOFT TISSUE
Status: COMPLETED | OUTPATIENT
Start: 2023-04-28 | End: 2023-04-28

## 2023-04-28 ASSESSMENT — PAIN SCALES - GENERAL
PAINLEVEL_OUTOF10: 0
PAINLEVEL_OUTOF10: 0

## 2023-04-28 ASSESSMENT — PAIN DESCRIPTION - DESCRIPTORS: DESCRIPTORS: ACHING

## 2023-04-28 ASSESSMENT — PAIN - FUNCTIONAL ASSESSMENT: PAIN_FUNCTIONAL_ASSESSMENT: 0-10

## 2023-04-28 NOTE — OP NOTE
Patient:  Eulalio Agarwal  YOB: 1960  Medical Record #:  [id number]   Date:  4/28/2023   Physician:  Samuel Bacon MD    PRE-PROCEDURE DIAGNOSIS: M54.13    POST-PROCEDURE DIAGNOSIS: M54.13    PROCEDURE:  Midline interlaminar left C7-T1 epidural steroid injection with fluoroscopy and epidurography. BRIEF HISTORY:  The patient presents today to [facility] for a scheduled cervical epidural steroid injection procedure. The patient was re-evaluated today and is clinically unchanged as compared to my previous evaluation. The patient is clinically stable to proceed with the procedure. PROCEDURE NOTE:  The procedure was again explained to the patient and the previously distributed pre-procedure literature was reviewed. The options, rationale, and benefits of the procedure including pain relief, functional improvement, and increased mobility, as well as the risks of the procedure including but not limited to infection, bleeding, paresthesia, pain, failure to relieve pain, increased pain, headache, allergic reaction, neurologic impairment, local anesthetic, toxicity, and side effects and the potential side effects of corticosteroids were discussed with the patient and informed written consent was obtained from the patient. The patient was positioned in the prone position on the fluoroscopy table. The skin overlying the cervical vertebrae was prepped using Chloraprep and draped in the usual sterile fashion. The C7-T1 intervertebral level was identified using intermittent AP fluoroscopy. The previously identified projection of overlying skin was anesthetized using 2 cc of buffered 1% lidocaine with a 27 gauge needle. A 3.5\" 22g Touhy needle was advanced through a small skin nick in the AP view towards the interlaminar and epidural space. The epidural space was easily identified using loss of resistance to saline. No difficulty, paresthesia or occurrence of pain was encountered.

## 2023-04-28 NOTE — DISCHARGE INSTRUCTIONS
[facility]   [address] [place], [address] [place] [address]   [address], [address] [place] [address]  [phone]      Patient:  Moi Mcwilliams  YOB: 1960  Medical Record #:  [id number]   Date:  4/28/2023   Physician:  Bernie Ayala MD    Procedure Performed: [unfilled]     Discharge Instructions    Notify Pain Management Services if any of the following occur:    Redness/Swelling at the injection site lasting longer than 2 days  Fever (with redness, swelling, or drainage at the injection site)  Drainage at the injection site  New weakness/ numbness  Severe headache   Loss of bowel/ bladder function    General Instructions: You may experience numbness for several hours following your treatment. You should be cautious using those areas which are numb. Once the numbness wears off, you may apply ice or heat to injection site, if needed. Do not return to work or drive today    Rest today and return to normal activities tomorrow. On average, the steroid takes about 1 week to work and can be up to 2 weeks    You should continue to depend on your primary physician for your medical management of conditions not related to your pain management treatment. Continue to take all your other medications, including blood thinners, as directed by your primary physician unless otherwise instructed. NO changes have been made to your medications. Any changes listed on the discharge are based on patient self reporting. Any EMR warnings regarding drug/drug interactions were dismissed based on current use by the patient, management by prescribing physician and pharmacist and not managed by this physician. Any problem which relates specifically to a treatment or procedure performed today should be directed to the [facility].        Riley Everett of Interventional Pain Management  [id number]

## 2023-04-28 NOTE — H&P
Patient:  Casey Montgomery  YOB: 1960  Medical Record #:  [id number]   Place: [address]  Date:  4/28/2023   Physician:  Misael Landa MD    History Obtained From: electronic medical record    HISTORY OF PRESENT ILLNESS    Past Medical History:        Diagnosis Date    Back pain     Migraines     PAF (paroxysmal atrial fibrillation) ([facility] [address] 2020    Thyroid disease     goiter 2010     Past Surgical History:        Procedure Laterality Date    APPENDECTOMY      BACK INJECTION Bilateral 09/09/2022    BILATERAL SACROILIAC JOINT INJECTION performed by Santosh Laird MD at [address] Bilateral 10/3/2022    BLEPHAROPLASTY - BILATERAL UPPER LID performed by Teresa Palomino MD at [facility] Bilateral     COLONOSCOPY      HYSTERECTOMY (CERVIX STATUS UNKNOWN)      NERVE BLOCK Left 08/05/2022    LEFT SCIATIC NERVE BLOCK performed by Santosh Laird MD at [address] Right 08/12/2022    RIGHT SCIATIC NERVE BLOCK performed by Santosh Laird MD at [address] Left 07/22/2022    LUMBAR EPIDURAL STEROID INJECTION LEFT L4-5 performed by Santosh Laird MD at [address] Right 10/28/2022    CERVICAL EPIDURAL STEROID INJECTION RIGHT C7-T1 performed by Santosh Laird MD at [address] Bilateral 11/11/2022    BILATERAL SUPRASCAPULAR NERVE BLOCK performed by Santosh Laird MD at [address] [address]  12/12/2022    US THYROID BIOPSY 12/12/2022 [facility] ULTRASOUND     Medications Prior to Admission:   No current facility-administered medications on file prior to encounter. Current Outpatient Medications on File Prior to Encounter   Medication Sig Dispense Refill    gabapentin (NEURONTIN) 400 MG capsule Take 1 capsule by mouth daily.  600 mg QHS      traMADol (ULTRAM)

## 2023-05-02 ENCOUNTER — OFFICE VISIT (OUTPATIENT)
Dept: ORTHOPEDIC SURGERY | Age: 63
End: 2023-05-02

## 2023-05-02 VITALS — WEIGHT: 136 LBS | BODY MASS INDEX: 28.55 KG/M2 | HEIGHT: 58 IN

## 2023-05-02 DIAGNOSIS — M22.42 CHONDROMALACIA OF BOTH PATELLAE: Primary | ICD-10-CM

## 2023-05-02 DIAGNOSIS — M25.561 ACUTE PAIN OF BOTH KNEES: ICD-10-CM

## 2023-05-02 DIAGNOSIS — M17.0 PRIMARY OSTEOARTHRITIS OF BOTH KNEES: ICD-10-CM

## 2023-05-02 DIAGNOSIS — M25.562 ACUTE PAIN OF BOTH KNEES: ICD-10-CM

## 2023-05-02 DIAGNOSIS — M22.41 CHONDROMALACIA OF BOTH PATELLAE: Primary | ICD-10-CM

## 2023-05-02 NOTE — PROGRESS NOTES
CC:  FU Knee Osteoarthritis with Viscosupplementation      Evaluation recurrent right greater than left anterior knee pain with mild swelling and history of multiple joint arthralgias followed by Dr. Steve Guy in rheumatology    History of Present Illness:  Nicolás Alvarado is a 59 y.o. female who is a very pleasant white female recreational walker who is an avid  and is a very nice patient of Dr. Jose Alejandro who is being seen today in kind consultation from Dr. Jose Alejandro for evaluation of ongoing pain to the anterior portion of her knees bilaterally. She states that since summer 2020, she began to notice increasing pain with crepitation and popping to the anterior portion of her knees. There is no history of actual injury or new activity prior to becoming symptomatic and she is having pain ranging between a 2-5 out of 10 involving the knee. The right primarily is worse than the left but is not really associated with pseudobuckling and she has not noticed locking or catching or popping. At rest she will have a dull achy pain at 2 out of 10 but will have sharp for 4-5 out of 10 pain so she is squatting kneeling such as gardening and with reading and does have pain with positional changes and stairs. She was recently switched off Celebrex by Dr. Steve Guy to try meloxicam but believes the Celebrex was working much better for her. She is being seen today for orthopedic and sports consultation with initial weightbearing imaging. We last saw Teddy Barillas in the office on 12/6/2022 for underlying symptomatic bilateral knee patellofemoral arthropathy with lesser rays weightbearing osteoarthritis. She done reasonably well while in [place] but is now having to take care of her  who just had knee arthroplasty last week on 3/31/2023. She did very well following her injections and at least now her pain symptoms are more achy in the range of 3-4 out of 10 but sometimes it does not bother her.   Once again she does

## 2023-06-21 NOTE — PROGRESS NOTES
[facility] END[facility]COPY [facility]  PRE-PROCEDURE INSTRUCTIONS    Procedure date___6/23/23______Arrival time____1000________        Procedure time____1100________       Screening questions for Pain procedures:  Do you have a current infection? ___n______  Are you currently taking an antibiotic?__n____  Are you taking a blood thinner?__n______    It is not necessary to stop eating or drinking prior to this procedure. We would like you to take your medications for blood pressure as usual.  You may be asked to stop blood thinners such as Coumadin, Plavix, Fragmin, Lovenox, etc., or any anti-inflammatories such as:  Aspirin, Ibuprofen, Advil, Naproxen prior to your procedure. We also ask that you stop any OTC medications that cause additional bleeding    You must make arrangements for a responsible adult to arrive with you and stay in our waiting area during your procedure. They will also need to take you home after your procedure. For your safety you will not be allowed to leave alone or drive yourself home. Also for your safety, it is strongly suggested that someone stay with you the first 24 hours after your procedure. For your comfort, please wear simple loose fitting clothing to the center. Please do not bring valuables. If you have a living will and a durable power of  for healthcare, please bring in a copy.     You will need to bring a photo ID and insurance card    Our goal is to provide you with excellent care so if you have any questions, please contact us at the [facility] at [phone]

## 2023-06-23 ENCOUNTER — APPOINTMENT (OUTPATIENT)
Dept: INTERVENTIONAL RADIOLOGY/VASCULAR | Age: 63
End: 2023-06-23
Attending: ANESTHESIOLOGY
Payer: MEDICARE

## 2023-06-23 ENCOUNTER — HOSPITAL ENCOUNTER (OUTPATIENT)
Age: 63
Setting detail: OUTPATIENT SURGERY
Discharge: HOME | End: 2023-06-23
Attending: ANESTHESIOLOGY | Admitting: ANESTHESIOLOGY
Payer: MEDICARE

## 2023-06-23 VITALS
RESPIRATION RATE: 18 BRPM | HEART RATE: 62 BPM | WEIGHT: 139 LBS | HEIGHT: 58 IN | TEMPERATURE: 96.6 F | SYSTOLIC BLOOD PRESSURE: 114 MMHG | BODY MASS INDEX: 29.18 KG/M2 | OXYGEN SATURATION: 96 % | DIASTOLIC BLOOD PRESSURE: 68 MMHG

## 2023-06-23 PROCEDURE — 6360000002 HC RX W HCPCS: Performed by: ANESTHESIOLOGY

## 2023-06-23 PROCEDURE — 3610000054 HC PAIN LEVEL 3 BASE (NON-OR): Performed by: ANESTHESIOLOGY

## 2023-06-23 PROCEDURE — 2709999900 HC NON-CHARGEABLE SUPPLY: Performed by: ANESTHESIOLOGY

## 2023-06-23 PROCEDURE — 6360000004 HC RX CONTRAST MEDICATION: Performed by: ANESTHESIOLOGY

## 2023-06-23 RX ORDER — METHYLPREDNISOLONE ACETATE 80 MG/ML
INJECTION, SUSPENSION INTRA-ARTICULAR; INTRALESIONAL; INTRAMUSCULAR; SOFT TISSUE
Status: COMPLETED | OUTPATIENT
Start: 2023-06-23 | End: 2023-06-23

## 2023-06-23 ASSESSMENT — PAIN SCALES - GENERAL
PAINLEVEL_OUTOF10: 0
PAINLEVEL_OUTOF10: 0

## 2023-06-23 ASSESSMENT — PAIN - FUNCTIONAL ASSESSMENT: PAIN_FUNCTIONAL_ASSESSMENT: 0-10

## 2023-06-23 NOTE — OP NOTE
Patient:  Ursula Holter  YOB: 1960  Medical Record #:  [id number]   Place: [address] [facility]  Date:  6/23/2023   Physician:  Jhonatan Ryan MD      PRE-PROCEDURE DIAGNOSIS: M54.16    POST-PROCEDURE DIAGNOSIS: M54.16    PROCEDURE:  Midline interlaminar right  L4-5 epidural steroid injection with fluoroscopy and epidurography. BRIEF HISTORY:  The patient presents today to [facility] for a scheduled lumbar epidural steroid injection procedure. The patient was re-evaluated today and is clinically unchanged as compared to my previous evaluation. The patient is clinically stable to proceed with the procedure. PROCEDURE NOTE:  The procedure was again explained to the patient and the previously distributed pre-procedure literature was reviewed. The options, rationale, and benefits of the procedure including pain relief, functional improvement, and increased mobility, as well as the risks of the procedure including but not limited to infection, bleeding, paresthesia, pain, failure to relieve pain, increased pain, headache, allergic reaction, neurologic impairment, local anesthetic, toxicity, and side effects and the potential side effects of corticosteroids were discussed with the patient and informed written consent was obtained from the patient. The patient was positioned in the prone position on the fluoroscopy table. The skin overlying the lumbosacral vertebrae was prepped using Chloraprep and draped in the usual sterile fashion. The L4-5 lumbar intervertebral level was identified using intermittent AP fluoroscopy. The previously identified projection of overlying skin was anesthetized using 2 cc of buffered 1% lidocaine with a 27 gauge needle. A 3.5\" 22g Touhy needle was advanced through a small skin nick in the AP view towards the interlaminar and epidural space. The epidural space was easily identified using loss of resistance to saline.   No difficulty,

## 2023-06-23 NOTE — DISCHARGE INSTRUCTIONS
[facility]   [address] [place], [address] [address]   [address], [address] [place] [address]  [phone]      Patient:  Jun Santacruz  YOB: 1960  Medical Record #:  [id number]   Date:  6/23/2023   Physician:  Zunilda Rider MD    Procedure Performed: [unfilled]     Discharge Instructions    Notify Pain Management Services if any of the following occur:    Redness/Swelling at the injection site lasting longer than 2 days  Fever (with redness, swelling, or drainage at the injection site)  Drainage at the injection site  New weakness/ numbness  Severe headache   Loss of bowel/ bladder function    General Instructions: You may experience numbness for several hours following your treatment. You should be cautious using those areas which are numb. Once the numbness wears off, you may apply ice or heat to injection site, if needed. Do not return to work or drive today    Rest today and return to normal activities tomorrow. On average, the steroid takes about 1 week to work and can be up to 2 weeks    You should continue to depend on your primary physician for your medical management of conditions not related to your pain management treatment. Continue to take all your other medications, including blood thinners, as directed by your primary physician unless otherwise instructed. NO changes have been made to your medications. Any changes listed on the discharge are based on patient self reporting. Any EMR warnings regarding drug/drug interactions were dismissed based on current use by the patient, management by prescribing physician and pharmacist and not managed by this physician. Any problem which relates specifically to a treatment or procedure performed today should be directed to the [facility].        Riley  of Interventional Pain Management  [phone]

## 2023-07-13 NOTE — PROGRESS NOTES
[facility] END[facility]COPY [facility]PAT[facility]  PRE-PROCEDURE INSTRUCTIONS    Procedure date__07/14/2023_______Arrival time__0930__________        Procedure time__1030__________       Screening questions for Pain procedures:  Do you have a current infection? __N_______  Are you currently taking an antibiotic?__N____  Are you taking a blood thinner?_N_______    It is not necessary to stop eating or drinking prior to this procedure. We would like you to take your medications for blood pressure as usual.  You may be asked to stop blood thinners such as Coumadin, Plavix, Fragmin, Lovenox, etc., or any anti-inflammatories such as:  Aspirin, Ibuprofen, Advil, Naproxen prior to your procedure. We also ask that you stop any OTC medications that cause additional bleeding    You must make arrangements for a responsible adult to arrive with you and stay in our waiting area during your procedure. They will also need to take you home after your procedure. For your safety you will not be allowed to leave alone or drive yourself home. Also for your safety, it is strongly suggested that someone stay with you the first 24 hours after your procedure. For your comfort, please wear simple loose fitting clothing to the center. Please do not bring valuables. If you have a living will and a durable power of  for healthcare, please bring in a copy.     You will need to bring a photo ID and insurance card    Our goal is to provide you with excellent care so if you have any questions, please contact us at the [address] [facility] at [phone]

## 2023-07-14 ENCOUNTER — HOSPITAL ENCOUNTER (OUTPATIENT)
Age: 63
Setting detail: OUTPATIENT SURGERY
Discharge: HOME | End: 2023-07-14
Attending: ANESTHESIOLOGY | Admitting: ANESTHESIOLOGY
Payer: MEDICARE

## 2023-07-14 ENCOUNTER — APPOINTMENT (OUTPATIENT)
Dept: INTERVENTIONAL RADIOLOGY/VASCULAR | Age: 63
End: 2023-07-14
Attending: ANESTHESIOLOGY
Payer: MEDICARE

## 2023-07-14 VITALS
WEIGHT: 141 LBS | HEART RATE: 80 BPM | BODY MASS INDEX: 29.6 KG/M2 | TEMPERATURE: 96.6 F | HEIGHT: 58 IN | SYSTOLIC BLOOD PRESSURE: 118 MMHG | DIASTOLIC BLOOD PRESSURE: 55 MMHG | OXYGEN SATURATION: 98 % | RESPIRATION RATE: 14 BRPM

## 2023-07-14 PROCEDURE — 2709999900 HC NON-CHARGEABLE SUPPLY: Performed by: ANESTHESIOLOGY

## 2023-07-14 PROCEDURE — 3610000054 HC PAIN LEVEL 3 BASE (NON-OR): Performed by: ANESTHESIOLOGY

## 2023-07-14 PROCEDURE — 6360000002 HC RX W HCPCS: Performed by: ANESTHESIOLOGY

## 2023-07-14 PROCEDURE — 2500000003 HC RX 250 WO HCPCS: Performed by: ANESTHESIOLOGY

## 2023-07-14 RX ORDER — BUPIVACAINE HYDROCHLORIDE 5 MG/ML
INJECTION, SOLUTION EPIDURAL; INTRACAUDAL
Status: COMPLETED | OUTPATIENT
Start: 2023-07-14 | End: 2023-07-14

## 2023-07-14 RX ORDER — METHYLPREDNISOLONE ACETATE 80 MG/ML
INJECTION, SUSPENSION INTRA-ARTICULAR; INTRALESIONAL; INTRAMUSCULAR; SOFT TISSUE
Status: COMPLETED | OUTPATIENT
Start: 2023-07-14 | End: 2023-07-14

## 2023-07-14 RX ORDER — LIDOCAINE HYDROCHLORIDE 10 MG/ML
INJECTION, SOLUTION EPIDURAL; INFILTRATION; INTRACAUDAL; PERINEURAL
Status: COMPLETED | OUTPATIENT
Start: 2023-07-14 | End: 2023-07-14

## 2023-07-14 ASSESSMENT — PAIN - FUNCTIONAL ASSESSMENT: PAIN_FUNCTIONAL_ASSESSMENT: NONE - DENIES PAIN

## 2023-07-14 ASSESSMENT — PAIN DESCRIPTION - LOCATION
LOCATION: BUTTOCKS
LOCATION: BUTTOCKS

## 2023-07-14 ASSESSMENT — PAIN DESCRIPTION - ORIENTATION
ORIENTATION: LEFT
ORIENTATION: LEFT

## 2023-07-14 ASSESSMENT — PAIN SCALES - GENERAL
PAINLEVEL_OUTOF10: 2
PAINLEVEL_OUTOF10: 2

## 2023-07-14 ASSESSMENT — PAIN DESCRIPTION - DESCRIPTORS
DESCRIPTORS: ACHING
DESCRIPTORS: ACHING

## 2023-07-14 NOTE — OP NOTE
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 BRIEF HISTORY:  The patient presents today to [facility] for a scheduled sciatic nerve block procedure. The patient was re-evaluated today and is clinically unchanged as compared to my previous evaluation. The patient is clinically stable to proceed with the procedure. PROCEDURE NOTE:  The procedure was again explained to the patient and the previously distributed pre-procedure literature was reviewed. The options, rationale, and benefits of the procedure including pain relief, functional improvement, and increased mobility, as well as the risks of the procedure including but not limited to infection, bleeding, paresthesia, pain, failure to relieve pain, increased pain, headache, allergic reaction, neurologic impairment, local anesthetic, toxicity, and side effects and the potential side effects of corticosteroids were discussed with the patient and informed written consent was obtained from the patient. The patient was brought to the fluoroscopy suite and placed in the prone position. The LEFT sacral and gluteal area was prepped in the usual sterile fashion with Chloraprep and then draped. Intermittent AP fluoroscopy was utilized to localize the radiographic landmarks. A standard perisciatic nerve block approach was used. The sciatic notch, at its superolateral border was localized at its junction with the ilium rostral to the acetabulum and lateral to the SI joint and lateral to the sciatic nerve. It was localized at the radiographic marker of the overlying sciatic nerve between the lateral aspect of the sacrum and the greater trochanter.    The superficial skin

## 2023-07-14 NOTE — H&P
Patient:  Ioana Crowder  YOB: 1960  Medical Record #:  [id number]   Place: [address] [place] [address]  Date:  7/14/2023   Physician:  Madalyn Boudreaux MD    History Obtained From: electronic medical record    HISTORY OF PRESENT ILLNESS    Past Medical History:        Diagnosis Date    Back pain     Migraines     PAF (paroxysmal atrial fibrillation) ([address]) 2020    Thyroid disease     goiter 2010     Past Surgical History:        Procedure Laterality Date    APPENDECTOMY      BACK INJECTION Bilateral 09/09/2022    BILATERAL SACROILIAC JOINT INJECTION performed by Sylvia Huerta MD at [address] Bilateral 10/3/2022    BLEPHAROPLASTY - BILATERAL UPPER LID performed by Katja Dong MD at [address] [facility] Bilateral     COLONOSCOPY      HYSTERECTOMY (CERVIX STATUS UNKNOWN)      NERVE BLOCK Left 08/05/2022    LEFT SCIATIC NERVE BLOCK performed by Sylvia Huerta MD at [facility] Right 08/12/2022    RIGHT SCIATIC NERVE BLOCK performed by Sylvia Huerta MD at [address] [place] [address] Dr Johansen Left 07/22/2022    LUMBAR EPIDURAL STEROID INJECTION LEFT L4-5 performed by Sylvia Huerta MD at [address] [place] [address] Dr Johansen Right 10/28/2022    CERVICAL EPIDURAL STEROID INJECTION RIGHT C7-T1 performed by Sylvia Huerta MD at [address] [place] [address] Dr Johansen Bilateral 11/11/2022    BILATERAL SUPRASCAPULAR NERVE BLOCK performed by Sylvia Huerta MD at [address] [place] [address] Dr Johansen Left 4/28/2023    CERVICAL EPIDURAL STEROID INJECTION LEFT C7-T1 performed by Sylvia Huerta MD at [address] [place] [address] Dr Johansen Right 6/23/2023    LUMBAR EPIDURAL STEROID INJECTION RIGHT L4-5 performed by Sylvia Huerta MD at [facility]  12/12/2022    US THYROID BIOPSY 12/12/2022 [facility] ULTRASOUND

## 2023-07-14 NOTE — PROGRESS NOTES
Pt continues to complain of numbness when standing on left side/leg. Pt ambulated and leg is \"buckling\". Pt refused to sit longer in post procedure to monitor numbness. Wheeled pt to car and assisted into truck with help of .

## 2023-07-14 NOTE — DISCHARGE INSTRUCTIONS
[facility]   [address] [facility] [place], [address]  [place], [address]   [address] [facility]  [address] [facility]  [phone]      Patient:  Mariah Jones  YOB: 1960  Medical Record #:  [id number]   Date:  7/14/2023   Physician:  Olena Virk MD    Procedure Performed: [unfilled]     Discharge Instructions    Notify Pain Management Services if any of the following occur:    Redness/Swelling at the injection site lasting longer than 2 days  Fever (with redness, swelling, or drainage at the injection site)  Drainage at the injection site  New weakness/ numbness  Severe headache   Loss of bowel/ bladder function    General Instructions: You may experience numbness for several hours following your treatment. You should be cautious using those areas which are numb. Once the numbness wears off, you may apply ice or heat to injection site, if needed. Do not return to work or drive today    Rest today and return to normal activities tomorrow. On average, the steroid takes about 1 week to work and can be up to 2 weeks    You should continue to depend on your primary physician for your medical management of conditions not related to your pain management treatment. Continue to take all your other medications, including blood thinners, as directed by your primary physician unless otherwise instructed. NO changes have been made to your medications. Any changes listed on the discharge are based on patient self reporting. Any EMR warnings regarding drug/drug interactions were dismissed based on current use by the patient, management by prescribing physician and pharmacist and not managed by this physician. Any problem which relates specifically to a treatment or procedure performed today should be directed to the [facility].        [id number] Brayan Castro of Interventional Pain Management  [phone]

## 2023-07-19 NOTE — PROGRESS NOTES
[facility] END[facility]COPY [facility]PAT[facility]  PRE-PROCEDURE INSTRUCTIONS    Procedure date_07/21/2023________Arrival time__0930__________        Procedure time__1030__________       Screening questions for Pain procedures:  Do you have a current infection? __N_______  Are you currently taking an antibiotic?_N_____  Are you taking a blood thinner?__N______    It is not necessary to stop eating or drinking prior to this procedure. We would like you to take your medications for blood pressure as usual.  You may be asked to stop blood thinners such as Coumadin, Plavix, Fragmin, Lovenox, etc., or any anti-inflammatories such as:  Aspirin, Ibuprofen, Advil, Naproxen prior to your procedure. We also ask that you stop any OTC medications that cause additional bleeding    You must make arrangements for a responsible adult to arrive with you and stay in our waiting area during your procedure. They will also need to take you home after your procedure. For your safety you will not be allowed to leave alone or drive yourself home. Also for your safety, it is strongly suggested that someone stay with you the first 24 hours after your procedure. For your comfort, please wear simple loose fitting clothing to the center. Please do not bring valuables. If you have a living will and a durable power of  for healthcare, please bring in a copy.     You will need to bring a photo ID and insurance card    Our goal is to provide you with excellent care so if you have any questions, please contact us at the [address] [facility] at [phone]

## 2023-07-21 ENCOUNTER — APPOINTMENT (OUTPATIENT)
Dept: INTERVENTIONAL RADIOLOGY/VASCULAR | Age: 63
End: 2023-07-21
Attending: ANESTHESIOLOGY
Payer: MEDICARE

## 2023-07-21 ENCOUNTER — HOSPITAL ENCOUNTER (OUTPATIENT)
Age: 63
Setting detail: OUTPATIENT SURGERY
Discharge: HOME | End: 2023-07-21
Attending: ANESTHESIOLOGY | Admitting: ANESTHESIOLOGY
Payer: MEDICARE

## 2023-07-21 VITALS
BODY MASS INDEX: 29.6 KG/M2 | HEIGHT: 58 IN | OXYGEN SATURATION: 100 % | WEIGHT: 141 LBS | HEART RATE: 69 BPM | RESPIRATION RATE: 16 BRPM | SYSTOLIC BLOOD PRESSURE: 128 MMHG | TEMPERATURE: 96.9 F | DIASTOLIC BLOOD PRESSURE: 80 MMHG

## 2023-07-21 PROCEDURE — 3610000054 HC PAIN LEVEL 3 BASE (NON-OR): Performed by: ANESTHESIOLOGY

## 2023-07-21 PROCEDURE — 2709999900 HC NON-CHARGEABLE SUPPLY: Performed by: ANESTHESIOLOGY

## 2023-07-21 PROCEDURE — 6360000002 HC RX W HCPCS: Performed by: ANESTHESIOLOGY

## 2023-07-21 PROCEDURE — 2500000003 HC RX 250 WO HCPCS: Performed by: ANESTHESIOLOGY

## 2023-07-21 RX ORDER — METHYLPREDNISOLONE ACETATE 40 MG/ML
INJECTION, SUSPENSION INTRA-ARTICULAR; INTRALESIONAL; INTRAMUSCULAR; SOFT TISSUE
Status: COMPLETED | OUTPATIENT
Start: 2023-07-21 | End: 2023-07-21

## 2023-07-21 RX ORDER — BUPIVACAINE HYDROCHLORIDE 5 MG/ML
INJECTION, SOLUTION EPIDURAL; INTRACAUDAL
Status: COMPLETED | OUTPATIENT
Start: 2023-07-21 | End: 2023-07-21

## 2023-07-21 RX ORDER — LIDOCAINE HYDROCHLORIDE 10 MG/ML
INJECTION, SOLUTION EPIDURAL; INFILTRATION; INTRACAUDAL; PERINEURAL
Status: COMPLETED | OUTPATIENT
Start: 2023-07-21 | End: 2023-07-21

## 2023-07-21 ASSESSMENT — PAIN - FUNCTIONAL ASSESSMENT
PAIN_FUNCTIONAL_ASSESSMENT: 0-10
PAIN_FUNCTIONAL_ASSESSMENT: PREVENTS OR INTERFERES SOME ACTIVE ACTIVITIES AND ADLS

## 2023-07-21 ASSESSMENT — PAIN DESCRIPTION - DESCRIPTORS: DESCRIPTORS: ACHING

## 2023-07-21 NOTE — OP NOTE
Patient:  Buffy Silverman  YOB: 1960  Medical Record #:  [id number]   Date:  7/21/2023   Physician:  Kimberly Cooper MD      PRE-PROCEDURE DIAGNOSIS:  Right sciatic neuropathy (G57.01)    POST-PROCEDURE DIAGNOSIS:  Right sciatic neuropathy (G57.01)    PROCEDURE: RIGHT sahil-sciatic nerve block, with fluoroscopy. BRIEF HISTORY:  The patient presents today to [facility] for a scheduled sciatic nerve block procedure. The patient was re-evaluated today and is clinically unchanged as compared to my previous evaluation. The patient is clinically stable to proceed with the procedure. PROCEDURE NOTE:  The procedure was again explained to the patient and the previously distributed pre-procedure literature was reviewed. The options, rationale, and benefits of the procedure including pain relief, functional improvement, and increased mobility, as well as the risks of the procedure including but not limited to infection, bleeding, paresthesia, pain, failure to relieve pain, increased pain, headache, allergic reaction, neurologic impairment, local anesthetic, toxicity, and side effects and the potential side effects of corticosteroids were discussed with the patient and informed written consent was obtained from the patient. The patient was brought to the fluoroscopy suite and placed in the prone position. The RIGHT sacral and gluteal area was prepped in the usual sterile fashion with Chloraprep and then draped. Intermittent AP fluoroscopy was utilized to localize the radiographic landmarks. A standard perisciatic nerve block approach was used. The sciatic notch, at its superolateral border was localized at its junction with the ilium rostral to the acetabulum and lateral to the SI joint and lateral to the sciatic nerve. It was localized at the radiographic marker of the overlying sciatic nerve between the lateral aspect of the sacrum and the greater trochanter.    The superficial skin

## 2023-07-21 NOTE — H&P
Patient:  Aniya Montes  YOB: 1960  Medical Record #:  [id number]   Place: [address] [place] [address]  Date:  7/21/2023   Physician:  Shiraz Castellano MD    History Obtained From: electronic medical record    HISTORY OF PRESENT ILLNESS    Past Medical History:        Diagnosis Date    Back pain     Migraines     PAF (paroxysmal atrial fibrillation) ([address]) 2020    Thyroid disease     goiter 2010     Past Surgical History:        Procedure Laterality Date    APPENDECTOMY      BACK INJECTION Bilateral 09/09/2022    BILATERAL SACROILIAC JOINT INJECTION performed by Merle Ochoa MD at [address] Bilateral 10/3/2022    BLEPHAROPLASTY - BILATERAL UPPER LID performed by Neela Zaman MD at [facility] Bilateral     COLONOSCOPY      HYSTERECTOMY (CERVIX STATUS UNKNOWN)      NERVE BLOCK Left 08/05/2022    LEFT SCIATIC NERVE BLOCK performed by Merle Ochoa MD at [facility] Right 08/12/2022    RIGHT SCIATIC NERVE BLOCK performed by Merle Ochoa MD at [facility] Left 7/14/2023    LEFT SCIATIC NERVE BLOCK performed by Merle Ochoa MD at [address] [place] [address] Dr Johansen Left 07/22/2022    LUMBAR EPIDURAL STEROID INJECTION LEFT L4-5 performed by Merle Ochoa MD at [address] [place] [address] Dr Johansen Right 10/28/2022    CERVICAL EPIDURAL STEROID INJECTION RIGHT C7-T1 performed by Merle Ochoa MD at [address] [place] [address] Dr Johansen Bilateral 11/11/2022    BILATERAL SUPRASCAPULAR NERVE BLOCK performed by Merle Ochoa MD at [address] [place] [address] Dr Johansen Left 4/28/2023    CERVICAL EPIDURAL STEROID INJECTION LEFT C7-T1 performed by Merle Ochoa MD at [address] [place] [address] Dr Johansen Right 6/23/2023    LUMBAR EPIDURAL STEROID INJECTION RIGHT L4-5 performed by Kenya Ray

## 2023-07-21 NOTE — DISCHARGE INSTRUCTIONS
[address] [facility] [address] [facility] [place], [address]  [phone]

## 2023-08-01 ENCOUNTER — HOSPITAL ENCOUNTER (OUTPATIENT)
Dept: WOMENS IMAGING | Age: 63
Discharge: HOME | End: 2023-08-01
Payer: MEDICARE

## 2023-08-01 DIAGNOSIS — Z12.31 BREAST CANCER SCREENING BY MAMMOGRAM: ICD-10-CM

## 2023-08-01 PROCEDURE — 77067 SCR MAMMO BI INCL CAD: CPT

## 2023-12-14 ENCOUNTER — OFFICE VISIT (OUTPATIENT)
Dept: ORTHOPEDIC SURGERY | Age: 63
End: 2023-12-14

## 2023-12-14 VITALS — BODY MASS INDEX: 28.76 KG/M2 | WEIGHT: 137 LBS | HEIGHT: 58 IN

## 2023-12-14 DIAGNOSIS — M70.61 GREATER TROCHANTERIC BURSITIS OF BOTH HIPS: ICD-10-CM

## 2023-12-14 DIAGNOSIS — M25.551 BILATERAL HIP PAIN: Primary | ICD-10-CM

## 2023-12-14 DIAGNOSIS — M16.0 BILATERAL PRIMARY OSTEOARTHRITIS OF HIP: ICD-10-CM

## 2023-12-14 DIAGNOSIS — M70.62 GREATER TROCHANTERIC BURSITIS OF BOTH HIPS: ICD-10-CM

## 2023-12-14 DIAGNOSIS — M76.30 ILIOTIBIAL BAND SYNDROME, UNSPECIFIED LATERALITY: ICD-10-CM

## 2023-12-14 DIAGNOSIS — M25.552 BILATERAL HIP PAIN: Primary | ICD-10-CM

## 2023-12-14 RX ORDER — MELOXICAM 15 MG/1
15 TABLET ORAL DAILY
Qty: 30 TABLET | Refills: 3 | Status: SHIPPED | OUTPATIENT
Start: 2023-12-14 | End: 2023-12-14 | Stop reason: SDUPTHER

## 2023-12-14 RX ORDER — BETAMETHASONE SODIUM PHOSPHATE AND BETAMETHASONE ACETATE 3; 3 MG/ML; MG/ML
12 INJECTION, SUSPENSION INTRA-ARTICULAR; INTRALESIONAL; INTRAMUSCULAR; SOFT TISSUE ONCE
Status: COMPLETED | OUTPATIENT
Start: 2023-12-14 | End: 2023-12-14

## 2023-12-14 RX ORDER — MELOXICAM 15 MG/1
15 TABLET ORAL DAILY
Qty: 30 TABLET | Refills: 3 | Status: SHIPPED | OUTPATIENT
Start: 2023-12-14

## 2023-12-14 RX ORDER — BUPIVACAINE HYDROCHLORIDE 2.5 MG/ML
2 INJECTION, SOLUTION INFILTRATION; PERINEURAL ONCE
Status: COMPLETED | OUTPATIENT
Start: 2023-12-14 | End: 2023-12-14

## 2023-12-14 RX ADMIN — BUPIVACAINE HYDROCHLORIDE 5 MG: 2.5 INJECTION, SOLUTION INFILTRATION; PERINEURAL at 15:30

## 2023-12-14 RX ADMIN — BETAMETHASONE SODIUM PHOSPHATE AND BETAMETHASONE ACETATE 12 MG: 3; 3 INJECTION, SUSPENSION INTRA-ARTICULAR; INTRALESIONAL; INTRAMUSCULAR; SOFT TISSUE at 15:29

## 2023-12-14 RX ADMIN — Medication 2 ML: at 15:30

## 2024-05-06 DIAGNOSIS — M70.61 GREATER TROCHANTERIC BURSITIS OF BOTH HIPS: ICD-10-CM

## 2024-05-06 DIAGNOSIS — M25.552 BILATERAL HIP PAIN: ICD-10-CM

## 2024-05-06 DIAGNOSIS — M25.551 BILATERAL HIP PAIN: ICD-10-CM

## 2024-05-06 DIAGNOSIS — M76.30 ILIOTIBIAL BAND SYNDROME, UNSPECIFIED LATERALITY: ICD-10-CM

## 2024-05-06 DIAGNOSIS — M16.0 BILATERAL PRIMARY OSTEOARTHRITIS OF HIP: ICD-10-CM

## 2024-05-06 DIAGNOSIS — M70.62 GREATER TROCHANTERIC BURSITIS OF BOTH HIPS: ICD-10-CM

## 2024-05-07 RX ORDER — MELOXICAM 15 MG/1
15 TABLET ORAL DAILY
Qty: 30 TABLET | Refills: 3 | Status: SHIPPED | OUTPATIENT
Start: 2024-05-07

## 2024-06-24 ENCOUNTER — HOSPITAL ENCOUNTER (OUTPATIENT)
Dept: PULMONOLOGY | Age: 64
Discharge: HOME | End: 2024-06-24
Payer: MEDICARE

## 2024-06-24 DIAGNOSIS — F17.200 SMOKER: ICD-10-CM

## 2024-06-24 DIAGNOSIS — R05.3 CHRONIC COUGH: ICD-10-CM

## 2024-06-24 LAB
DLCO %PRED: 112 %
DLCO PRED: NORMAL
DLCO/VA %PRED: NORMAL
DLCO/VA PRED: NORMAL
DLCO/VA: NORMAL
DLCO: NORMAL
EXPIRATORY TIME-POST: NORMAL
EXPIRATORY TIME: NORMAL
FEF 25-75 %CHNG: NORMAL
FEF 25-75 POST %PRED: NORMAL
FEF 25-75% %PRED-PRE: NORMAL
FEF 25-75% PRED: NORMAL
FEF 25-75-POST: NORMAL
FEF 25-75-PRE: NORMAL
FEV1 %PRED-POST: 84 %
FEV1 %PRED-PRE: 80 %
FEV1 PRED: NORMAL
FEV1-POST: NORMAL
FEV1-PRE: NORMAL
FEV1/FVC %PRED-POST: NORMAL
FEV1/FVC %PRED-PRE: NORMAL
FEV1/FVC PRED: NORMAL
FEV1/FVC-POST: 74 %
FEV1/FVC-PRE: 73 %
FVC %PRED-POST: NORMAL
FVC %PRED-PRE: NORMAL
FVC PRED: NORMAL
FVC-POST: NORMAL
FVC-PRE: NORMAL
GAW %PRED: NORMAL
GAW PRED: NORMAL
GAW: NORMAL
IC PRE %PRED: NORMAL
IC PRED: NORMAL
IC: NORMAL
MEP: NORMAL
MIP: NORMAL
MVV %PRED-PRE: NORMAL
MVV PRED: NORMAL
MVV-PRE: NORMAL
PEF %PRED-POST: NORMAL
PEF %PRED-PRE: NORMAL
PEF PRED: NORMAL
PEF%CHNG: NORMAL
PEF-POST: NORMAL
PEF-PRE: NORMAL
RAW %PRED: NORMAL
RAW PRED: NORMAL
RAW: NORMAL
RV PRE %PRED: NORMAL
RV PRED: NORMAL
RV: NORMAL
SVC %PRED: NORMAL
SVC PRED: NORMAL
SVC: NORMAL
TLC PRE %PRED: 117 %
TLC PRED: NORMAL
TLC: NORMAL
VA %PRED: NORMAL
VA PRED: NORMAL
VA: NORMAL
VTG %PRED: NORMAL
VTG PRED: NORMAL
VTG: NORMAL

## 2024-06-24 PROCEDURE — 94729 DIFFUSING CAPACITY: CPT

## 2024-06-24 PROCEDURE — 94060 EVALUATION OF WHEEZING: CPT

## 2024-06-24 PROCEDURE — 6370000000 HC RX 637 (ALT 250 FOR IP): Performed by: FAMILY MEDICINE

## 2024-06-24 PROCEDURE — 94726 PLETHYSMOGRAPHY LUNG VOLUMES: CPT

## 2024-06-24 PROCEDURE — 94664 DEMO&/EVAL PT USE INHALER: CPT

## 2024-06-24 PROCEDURE — 94640 AIRWAY INHALATION TREATMENT: CPT

## 2024-06-24 RX ORDER — ALBUTEROL SULFATE 90 UG/1
4 AEROSOL, METERED RESPIRATORY (INHALATION) ONCE
Status: COMPLETED | OUTPATIENT
Start: 2024-06-24 | End: 2024-06-24

## 2024-06-24 RX ADMIN — ALBUTEROL SULFATE 4 PUFF: 90 AEROSOL, METERED RESPIRATORY (INHALATION) at 12:13

## 2024-06-24 ASSESSMENT — PULMONARY FUNCTION TESTS
FEV1_PERCENT_PREDICTED_POST: 84
FEV1_PERCENT_PREDICTED_PRE: 80
FEV1/FVC_PRE: 73
FEV1/FVC_POST: 74

## 2024-06-25 NOTE — PROCEDURES
Spirometry was acceptable and reproducible by ATS standards    Spirometry  Spirometry is normal.    Bronchodilator  There is no response to bronchodilator demonstrated.   [Increase in FEV1 and/or FVC => 12% of control and => 200 ml]    Lung Volumes  Lung volumes are normal.    Diffusing Capacity  Diffusing capacity is normal.      Overall Interpretation  Normal pulmonary function test with bronchodilator response.      Pulmonary Function Testing Results:    FEV1 %Pred-Pre   Date Value Ref Range Status   06/24/2024 80 % Final     FEV1 %Pred-Post   Date Value Ref Range Status   06/24/2024 84 % Final     FEV1/FVC-Pre   Date Value Ref Range Status   06/24/2024 73 % Final     FEV1/FVC-Post   Date Value Ref Range Status   06/24/2024 74 % Final     TLC Pre %Pred   Date Value Ref Range Status   06/24/2024 117 % Final     DLCO %Pred   Date Value Ref Range Status   06/24/2024 112 % Final                Obstruction severity and Restriction Severity using FEV1 %  Gold Stage Severity per ERS/ATS FEV1 % per ERS/ATS   GOLD I:  FEV1>80% Mild COPD >70   GOLD II:  FEV1 50-79% Moderate 50-70   GOLD III:  FEV1 30-49% Severe 35-50   GOLD IV:  FEV1 <30 Very Severe  <35       DCLO:  Normal:  %  Mild:  65-70%  Moderate:  41-60%  Severe:  <40%      PFT data will be scanned into the procedure tab in epic under this encounter    Toni Magallanes MD  [facility]

## 2024-08-06 ENCOUNTER — HOSPITAL ENCOUNTER (OUTPATIENT)
Dept: WOMENS IMAGING | Age: 64
Discharge: HOME | End: 2024-08-06
Payer: MEDICARE

## 2024-08-06 VITALS — BODY MASS INDEX: 28.76 KG/M2 | HEIGHT: 58 IN | WEIGHT: 137 LBS

## 2024-08-06 DIAGNOSIS — Z12.31 SCREENING MAMMOGRAM FOR BREAST CANCER: ICD-10-CM

## 2024-08-06 PROCEDURE — 77063 BREAST TOMOSYNTHESIS BI: CPT

## 2024-08-14 ENCOUNTER — TELEPHONE (OUTPATIENT)
Dept: ENDOCRINOLOGY | Age: 64
End: 2024-08-14

## 2024-08-14 DIAGNOSIS — E04.1 THYROID NODULE: Primary | ICD-10-CM

## 2024-08-14 NOTE — TELEPHONE ENCOUNTER
Pt has NOV: 12/19/24  Would MD like to have BW done prior to upcoming appt? Pt is also wanting to know if Dr. Barlow would like to add any ultrasound scans?    Please advise   CB# [phone]

## 2024-08-14 NOTE — TELEPHONE ENCOUNTER
Placed order for labs and USG   Pt returning call to Aster, writer was unable to reach the nurse back line or Aster's line. Please call pt back at [phone].

## 2024-08-15 ENCOUNTER — OFFICE VISIT (OUTPATIENT)
Dept: ORTHOPEDIC SURGERY | Age: 64
End: 2024-08-15

## 2024-08-15 VITALS — HEIGHT: 58 IN | BODY MASS INDEX: 28.76 KG/M2 | WEIGHT: 137 LBS

## 2024-08-15 DIAGNOSIS — M51.36 LUMBAR DEGENERATIVE DISC DISEASE: ICD-10-CM

## 2024-08-15 DIAGNOSIS — M76.30 ILIOTIBIAL BAND SYNDROME, UNSPECIFIED LATERALITY: ICD-10-CM

## 2024-08-15 DIAGNOSIS — M79.604 PAIN IN BOTH LOWER EXTREMITIES: ICD-10-CM

## 2024-08-15 DIAGNOSIS — M54.50 LUMBAR PAIN: Primary | ICD-10-CM

## 2024-08-15 DIAGNOSIS — M25.552 BILATERAL HIP PAIN: ICD-10-CM

## 2024-08-15 DIAGNOSIS — M43.06 LUMBAR SPONDYLOLYSIS: ICD-10-CM

## 2024-08-15 DIAGNOSIS — M79.605 PAIN IN BOTH LOWER EXTREMITIES: ICD-10-CM

## 2024-08-15 DIAGNOSIS — M70.62 GREATER TROCHANTERIC BURSITIS OF BOTH HIPS: ICD-10-CM

## 2024-08-15 DIAGNOSIS — M70.61 GREATER TROCHANTERIC BURSITIS OF BOTH HIPS: ICD-10-CM

## 2024-08-15 DIAGNOSIS — M25.551 BILATERAL HIP PAIN: ICD-10-CM

## 2024-08-15 PROBLEM — M51.369 LUMBAR DEGENERATIVE DISC DISEASE: Status: ACTIVE | Noted: 2024-08-15

## 2024-08-15 RX ORDER — ROSUVASTATIN CALCIUM 5 MG/1
5 TABLET, COATED ORAL EVERY EVENING
COMMUNITY
Start: 2024-08-13 | End: 2025-02-09

## 2024-08-15 RX ORDER — METHYLPREDNISOLONE 4 MG/1
TABLET ORAL
Qty: 21 KIT | Refills: 0 | Status: SHIPPED | OUTPATIENT
Start: 2024-08-15

## 2024-08-15 RX ORDER — ALBUTEROL SULFATE 90 UG/1
2 AEROSOL, METERED RESPIRATORY (INHALATION)
COMMUNITY
Start: 2024-08-13 | End: 2024-11-11

## 2024-08-15 RX ORDER — MELOXICAM 15 MG/1
15 TABLET ORAL DAILY
Qty: 30 TABLET | Refills: 3 | Status: SHIPPED | OUTPATIENT
Start: 2024-08-15

## 2024-08-15 NOTE — PROGRESS NOTES
and may need repeat epidurals.  Will get the MRI performed and have her follow-up at [facility].  We did place her on a Medrol Dosepak to be followed by having her resume her meloxicam 15 mg daily and I asked that she increase her gabapentin to 300 mg at least twice daily initially to see if this helps with her leg symptoms.  She was placed in a warm-and-form belt and will have her attend physical therapy formally for her hips and IT band as well as reeducation of her lumbar stabilization exercise program.  If she is having residual lateral hip discomfort after 4 to 6 weeks of rehab we will see her back but otherwise I think the majority of her symptoms are likely referred from her lumbar spine currently will follow-up with [facility].  They will contact us with questions or concerns.       CC: Dr. Trang Bailey  This dictation was performed with a verbal recognition program (DRAGON) and it was checked for errors. It is possible that there are still dictated errors within this office note. If so, please bring any errors to my attention for an addendum. All efforts were made to ensure that this office note is accurate.

## 2024-08-15 NOTE — PATIENT INSTRUCTIONS
If you're currently taking an anti-inflammatory such as advil, aleve, ibuprofen, diclofenac, naproxen, meloxicam, celebrex, or nabumetone, please stop.    Take Medrol first for 6 days. This is a steroid pack. Flip the package over to the foil side and the directions will tell you to start with 6 pills the first day, 5 pills the second day, etc. Please do not take any other anti-inflammatories with the medrol dose ronald as this can upset your stomach. If something else is needed, you may take extra strength tylenol.     Once you are finished with the medrol, then you may re-start or start your anti-inflammatory: MELOXICAM

## 2024-08-16 ENCOUNTER — HOSPITAL ENCOUNTER (OUTPATIENT)
Age: 64
Discharge: HOME | End: 2024-08-16
Payer: MEDICARE

## 2024-08-16 ENCOUNTER — TELEPHONE (OUTPATIENT)
Dept: ORTHOPEDIC SURGERY | Age: 64
End: 2024-08-16

## 2024-08-16 ENCOUNTER — HOSPITAL ENCOUNTER (OUTPATIENT)
Dept: ULTRASOUND IMAGING | Age: 64
Discharge: HOME | End: 2024-08-16
Payer: MEDICARE

## 2024-08-16 DIAGNOSIS — E04.1 THYROID NODULE: ICD-10-CM

## 2024-08-16 LAB
T3 SERPL-MCNC: 1.02 NG/ML (ref 0.8–2)
T4 FREE SERPL-MCNC: 1.4 NG/DL (ref 0.9–1.8)
TSH SERPL DL<=0.005 MIU/L-ACNC: 0.14 UIU/ML (ref 0.27–4.2)

## 2024-08-16 PROCEDURE — 76536 US EXAM OF HEAD AND NECK: CPT

## 2024-08-16 NOTE — TELEPHONE ENCOUNTER
Left voicemail for patient that their MRI has been authorized and that they can call and schedule scan at their convenience. Also told them that they can call and schedule a f/u with Dr. Bailey once they have MRI scheduled, leaving at least 2-3 days for our office to receive their results.

## 2024-08-20 ENCOUNTER — OFFICE VISIT (OUTPATIENT)
Dept: ENDOCRINOLOGY | Age: 64
End: 2024-08-20
Payer: MEDICARE

## 2024-08-20 VITALS
SYSTOLIC BLOOD PRESSURE: 134 MMHG | HEART RATE: 73 BPM | RESPIRATION RATE: 14 BRPM | TEMPERATURE: 98 F | BODY MASS INDEX: 27.08 KG/M2 | WEIGHT: 129 LBS | DIASTOLIC BLOOD PRESSURE: 80 MMHG | HEIGHT: 58 IN

## 2024-08-20 DIAGNOSIS — E04.1 THYROID NODULE: Primary | ICD-10-CM

## 2024-08-20 DIAGNOSIS — E03.9 ACQUIRED HYPOTHYROIDISM: ICD-10-CM

## 2024-08-20 PROCEDURE — 3017F COLORECTAL CA SCREEN DOC REV: CPT | Performed by: INTERNAL MEDICINE

## 2024-08-20 PROCEDURE — G8427 DOCREV CUR MEDS BY ELIG CLIN: HCPCS | Performed by: INTERNAL MEDICINE

## 2024-08-20 PROCEDURE — G2211 COMPLEX E/M VISIT ADD ON: HCPCS | Performed by: INTERNAL MEDICINE

## 2024-08-20 PROCEDURE — 99214 OFFICE O/P EST MOD 30 MIN: CPT | Performed by: INTERNAL MEDICINE

## 2024-08-20 PROCEDURE — 4004F PT TOBACCO SCREEN RCVD TLK: CPT | Performed by: INTERNAL MEDICINE

## 2024-08-20 PROCEDURE — G8417 CALC BMI ABV UP PARAM F/U: HCPCS | Performed by: INTERNAL MEDICINE

## 2024-08-20 NOTE — PROGRESS NOTES
Subjective:      65 y/o WF who is here for thyroid evaluation/thyroid nodule .     Referred by Dr. Mateusz Webster    Noticed on MRI    9/22 MRI    SOFT TISSUES: Partially imaged right thyroid nodule measures at least 2.1 cm   in diameter.     Initial complaints: hoarse  History of obstructive symptoms:  difficulty swallowing No, changes in voice/hoarseness  No.    Reports goiter 12 years ago, was started on levothyroxine , goiter resolved  Also states it was aspirated, fluid removed    Mild controlled    No worsening factors    USG 11/22  FINDINGS:  Right thyroid lobe:  5.1 cm x 3.5 cm x 3.0 cm     Left thyroid lobe:  4.5 cm x 1.6 cm x 2.2 cm     Isthmus:  0.2 cm     THYROID GLAND:  Moderate enlargement of the right lobe due to the below  nodule with otherwise normal size.  Normal echogenicity and echotexture.  Increased vascularity associated with the below nodule with otherwise normal  background parenchymal vascularity.     NODULES:  As below.     - Nodule 1:  4.0 cm x 3.6 cm x 2.8 cm (3.5 cm x 2.8 cm x 1.7 cm on  12/08/2007), right lobe, almost completely solid (2), isoechoic (1),  wider-than-tall (0), ill-defined margins (0), macrocalcifications (1), TR4,  biopsy on 12/08/2007    H/o benign FNA in 2007    Recommend FNA  Benign 11/22    USG 8/24    Right 4.4cm nodule    Left 2.3cm nodule      She has hypothyroidism  On levothyroxine 50mcg    History of radiation to patient's neck: no  Family history includes sister thyroid problem  Family history of thyroid cancer:  no    2/22 TSH 2.23  8/24 TSH 0.14      Mild, controlled  No worsening factors    Past Medical History:   Diagnosis Date    Back pain     Migraines     PAF (paroxysmal atrial fibrillation) (HCC) 2020    Thyroid disease     goiter 2010     Past Surgical History:   Procedure Laterality Date    APPENDECTOMY      BACK INJECTION Bilateral 09/09/2022    BILATERAL SACROILIAC JOINT INJECTION performed by Fidelia Eid MD at [facility] ENDOSCOPY

## 2024-08-22 ENCOUNTER — TELEPHONE (OUTPATIENT)
Dept: ORTHOPEDIC SURGERY | Age: 64
End: 2024-08-22

## 2024-08-29 ENCOUNTER — HOSPITAL ENCOUNTER (OUTPATIENT)
Dept: ULTRASOUND IMAGING | Age: 64
Discharge: HOME | End: 2024-08-29
Attending: INTERNAL MEDICINE
Payer: MEDICARE

## 2024-08-29 DIAGNOSIS — E03.9 ACQUIRED HYPOTHYROIDISM: ICD-10-CM

## 2024-08-29 DIAGNOSIS — E04.1 THYROID NODULE: ICD-10-CM

## 2024-08-29 PROCEDURE — 76942 ECHO GUIDE FOR BIOPSY: CPT

## 2024-08-29 PROCEDURE — 60100 BIOPSY OF THYROID: CPT

## 2024-08-29 PROCEDURE — 88305 TISSUE EXAM BY PATHOLOGIST: CPT

## 2024-08-29 PROCEDURE — 88173 CYTOPATH EVAL FNA REPORT: CPT

## 2024-08-29 NOTE — DISCHARGE INSTRUCTIONS
INTERVENTIONAL RADIOLOGY DEPARTMENT  [facility]  [address], [place] [address]  Telephone: [phone]      PATIENT NAME: Twyla Garcia  MEDICAL RECORD NUMBER:  [id number]  TODAY'S DATE: @ED@    Discharge Instructions - Post Thyroid Biopsy    [x] Place a cold pack  on top of the dressing for at least 3 hours removing it every 20 minutes for 5 minutes after your biopsy.     [x] Do not take Aspirin the day of your biopsy.    [x] Your physician has instructed you to take Tylenol (Acetaminophen) the day of your biopsy for any discomfort.    [x] Watch for excessive bleeding or pain.  If this occurs call Jp Carter MD.    Some minor swelling and discomfort is to be expected.  You may take Tylenol or Advil if needed.  If the swelling increases or you have difficulty swallowing / talking - proceed to the closest emergency room.    [x] Do not participate in any strenuous exercise for 48 hours after your biopsy, such as tennis, aerobics, weight lifting, etc.    [x] You may remove your dressing tomorrow and shower.         Your physician will receive a report within 2-3 business days.    Please contact the office for results.

## 2024-12-18 ENCOUNTER — OFFICE VISIT (OUTPATIENT)
Dept: ORTHOPEDIC SURGERY | Age: 64
End: 2024-12-18

## 2024-12-18 ENCOUNTER — TELEPHONE (OUTPATIENT)
Dept: ORTHOPEDIC SURGERY | Age: 64
End: 2024-12-18

## 2024-12-18 VITALS — BODY MASS INDEX: 27.83 KG/M2 | HEIGHT: 57 IN | WEIGHT: 129 LBS

## 2024-12-18 DIAGNOSIS — M25.552 LEFT HIP PAIN: ICD-10-CM

## 2024-12-18 DIAGNOSIS — R29.898 WEAKNESS OF LEFT HIP: Primary | ICD-10-CM

## 2024-12-18 DIAGNOSIS — M70.62 GREATER TROCHANTERIC BURSITIS OF LEFT HIP: ICD-10-CM

## 2024-12-18 RX ORDER — BEMPEDOIC ACID AND EZETIMIBE 180; 10 MG/1; MG/1
1 TABLET, FILM COATED ORAL DAILY
COMMUNITY
Start: 2024-09-20

## 2024-12-18 RX ORDER — LIDOCAINE HYDROCHLORIDE 10 MG/ML
1 INJECTION, SOLUTION INFILTRATION; PERINEURAL ONCE
Status: COMPLETED | OUTPATIENT
Start: 2024-12-18 | End: 2024-12-18

## 2024-12-18 RX ORDER — VENLAFAXINE HYDROCHLORIDE 150 MG/1
150 CAPSULE, EXTENDED RELEASE ORAL
COMMUNITY
Start: 2024-11-22

## 2024-12-18 RX ORDER — LORAZEPAM 1 MG/1
TABLET ORAL
COMMUNITY

## 2024-12-18 RX ORDER — MELOXICAM 15 MG/1
15 TABLET ORAL DAILY
Qty: 30 TABLET | Refills: 3 | Status: SHIPPED | OUTPATIENT
Start: 2024-12-18

## 2024-12-18 RX ORDER — BETAMETHASONE SODIUM PHOSPHATE AND BETAMETHASONE ACETATE 3; 3 MG/ML; MG/ML
6 INJECTION, SUSPENSION INTRA-ARTICULAR; INTRALESIONAL; INTRAMUSCULAR; SOFT TISSUE ONCE
Status: COMPLETED | OUTPATIENT
Start: 2024-12-18 | End: 2024-12-18

## 2024-12-18 RX ORDER — ASPIRIN 81 MG/1
81 TABLET ORAL DAILY
COMMUNITY
Start: 2024-12-16

## 2024-12-18 RX ORDER — TRAZODONE HYDROCHLORIDE 50 MG/1
TABLET, FILM COATED ORAL
COMMUNITY

## 2024-12-18 RX ORDER — PANTOPRAZOLE SODIUM 40 MG/1
TABLET, DELAYED RELEASE ORAL
COMMUNITY
Start: 2024-11-26

## 2024-12-18 RX ORDER — BUPROPION HYDROCHLORIDE 300 MG/1
TABLET ORAL
COMMUNITY
Start: 2024-12-17

## 2024-12-18 RX ORDER — HYDROXYZINE HYDROCHLORIDE 10 MG/1
10 TABLET, FILM COATED ORAL EVERY 6 HOURS PRN
COMMUNITY

## 2024-12-18 RX ORDER — TRIAMCINOLONE ACETONIDE 1 MG/G
CREAM TOPICAL
COMMUNITY

## 2024-12-18 RX ORDER — BUPIVACAINE HYDROCHLORIDE 2.5 MG/ML
1 INJECTION, SOLUTION INFILTRATION; PERINEURAL ONCE
Status: COMPLETED | OUTPATIENT
Start: 2024-12-18 | End: 2024-12-18

## 2024-12-18 RX ORDER — FAMOTIDINE 20 MG/1
20 TABLET, FILM COATED ORAL DAILY
COMMUNITY

## 2024-12-18 RX ADMIN — LIDOCAINE HYDROCHLORIDE 1 ML: 10 INJECTION, SOLUTION INFILTRATION; PERINEURAL at 10:37

## 2024-12-18 RX ADMIN — BUPIVACAINE HYDROCHLORIDE 2.5 MG: 2.5 INJECTION, SOLUTION INFILTRATION; PERINEURAL at 10:36

## 2024-12-18 RX ADMIN — BETAMETHASONE SODIUM PHOSPHATE AND BETAMETHASONE ACETATE 6 MG: 3; 3 INJECTION, SUSPENSION INTRA-ARTICULAR; INTRALESIONAL; INTRAMUSCULAR; SOFT TISSUE at 10:36

## 2024-12-18 NOTE — PROGRESS NOTES
would call her straight leg raising and logroll testing negative today.  Mild discomfort with hip impingement testing.    Skin: There are no rashes, ulcerations or lesions.    Gait: Staggered upon raising from chair and initial few steps, with continued pacing gait becomes smoother and more fluid    Reflex symmetrically preserved    Additional Comments: Lumbar spine evaluation is remarkable for improved tenderness along lumbar paraspinals.  Minimal kyphosis.  She does have much less of her reproducible pain with palpation of the central and lateral gluteal regions.  She can recently forward flex to about 60 to 70 degrees but does have pain coming up.  Less pain with extension producing primarily gluteal discomfort but does not necessarily seem to radiate pain into her lower extremities.  She does have apparent negative straight leg raising but is tight in her hamstrings and this proximal gluteal pain on the right.  There is not appear to be focal lower extremity motor deficits.          Diagnostic Test Findings: AP pelvis and frog films were reviewed from 12/14/2023 and revealed possible slight arthritic changes in the hips    AP and lateral lumbar spine films were reviewed from 8/15/2024 which does show significant lower lumbar spine facet arthropathy with degenerative disc changes most prominent at L4-5 and in particular L5-S1 with a trace retrolisthesis.  No evidence of high-grade acute osseous injury    Assessment :      6+ months status post substantially improved chronic episodic mechanical low back pain with multilevel lumbar degenerative disc disease with facet arthropathy low-grade suspected L5-S1 spondylolisthesis and spondylolysis with occasional bilateral leg pain already established with Dr. Fidelia Eid at [facility].    #2.  Persistent symptomatic left lateral hip pain with suspected trochanteric bursitis with hip weakness and IT band  Minimally symptomatic mild osteoarthritis of the hips

## 2024-12-30 ENCOUNTER — HOSPITAL ENCOUNTER (OUTPATIENT)
Dept: PHYSICAL THERAPY | Age: 64
Setting detail: THERAPIES SERIES
Discharge: HOME | End: 2024-12-30
Payer: MEDICARE

## 2024-12-30 DIAGNOSIS — M25.552 LEFT HIP PAIN: Primary | ICD-10-CM

## 2024-12-30 DIAGNOSIS — R53.1 WEAKNESS: ICD-10-CM

## 2024-12-30 PROCEDURE — 97112 NEUROMUSCULAR REEDUCATION: CPT

## 2024-12-30 PROCEDURE — 97161 PT EVAL LOW COMPLEX 20 MIN: CPT

## 2024-12-30 PROCEDURE — 97110 THERAPEUTIC EXERCISES: CPT

## 2024-12-30 NOTE — PLAN OF CARE
[facility]- [facility] [address], [place] [address] office: [phone] fax: [phone]     Physical Therapy Initial Evaluation Certification      Dear Dmitriy Bailey MD,    We had the pleasure of evaluating the following patient for physical therapy services at [facility].  A summary of our findings can be found in the initial assessment below.  This includes our plan of care.  If you have any questions or concerns regarding these findings, please do not hesitate to contact me at the office phone number listed above.  Thank you for the referral.     Physician Signature:_______________________________Date:__________________  By signing above (or electronic signature), therapist’s plan is approved by physician       Physical Therapy: TREATMENT/PROGRESS NOTE   Patient: Twyla Garcia (64 y.o. female)   Examination Date: 2024   :  1960 MRN: [id number]   Visit #: 1   Insurance Allowable Auth Needed   BMN [x]Yes    []No    Insurance: Payor: HU[employer] MEDICARE / Plan: HU[employer] CHOICE-PPO MEDICARE / Product Type: *No Product type* /   Insurance ID: [id number] - (Medicare Managed)  Secondary Insurance (if applicable):    Treatment Diagnosis:     ICD-10-CM    1. Left hip pain  M25.552       2. Weakness  R53.1          Medical Diagnosis:  Weakness of left hip [R29.898]  Left hip pain [M25.552]  Greater trochanteric bursitis of left hip [M70.62]   Referring Physician: Dmitriy Bailey MD  PCP: Trang Denny MD     Plan of care signed (Y/N): Pending    Date of Patient follow up with Physician: not scheduled     Progress Report/POC: EVAL today  POC update due: (10 visits /OR AUTH LIMITS, whichever is less)  2025                                             Precautions/ Contra-indications:           Latex allergy:  NO  Pacemaker:    NO  Contraindications for Manipulation: None  Date of Surgery: N/A  Other:    Red

## 2025-01-13 ENCOUNTER — OFFICE VISIT (OUTPATIENT)
Dept: ORTHOPEDIC SURGERY | Age: 65
End: 2025-01-13
Payer: MEDICARE

## 2025-01-13 VITALS — BODY MASS INDEX: 27.83 KG/M2 | HEIGHT: 57 IN | WEIGHT: 129 LBS

## 2025-01-13 DIAGNOSIS — M67.952 TENDINOPATHY OF LEFT GLUTEAL REGION: ICD-10-CM

## 2025-01-13 DIAGNOSIS — S73.192A TEAR OF LEFT ACETABULAR LABRUM, INITIAL ENCOUNTER: ICD-10-CM

## 2025-01-13 DIAGNOSIS — M54.50 LUMBAR PAIN: ICD-10-CM

## 2025-01-13 DIAGNOSIS — M43.06 LUMBAR SPONDYLOLYSIS: ICD-10-CM

## 2025-01-13 DIAGNOSIS — M51.362 DEGENERATION OF INTERVERTEBRAL DISC OF LUMBAR REGION WITH DISCOGENIC BACK PAIN AND LOWER EXTREMITY PAIN: ICD-10-CM

## 2025-01-13 DIAGNOSIS — M25.552 LEFT HIP PAIN: Primary | ICD-10-CM

## 2025-01-13 PROCEDURE — G8417 CALC BMI ABV UP PARAM F/U: HCPCS | Performed by: FAMILY MEDICINE

## 2025-01-13 PROCEDURE — 3017F COLORECTAL CA SCREEN DOC REV: CPT | Performed by: FAMILY MEDICINE

## 2025-01-13 PROCEDURE — 99213 OFFICE O/P EST LOW 20 MIN: CPT | Performed by: FAMILY MEDICINE

## 2025-01-13 PROCEDURE — 4004F PT TOBACCO SCREEN RCVD TLK: CPT | Performed by: FAMILY MEDICINE

## 2025-01-13 PROCEDURE — G8427 DOCREV CUR MEDS BY ELIG CLIN: HCPCS | Performed by: FAMILY MEDICINE

## 2025-01-13 NOTE — PROGRESS NOTES
Chief Complaint    Hip Pain (left)    Initial evaluation ongoing left hip, gluteal and leg pain.  Review of left hip imaging    History of mechanical low back pain with disc herniation and radiculopathy currently under the care at [facility] with Dr. Eid pending separate epidural intervention end of January 2025    History of Present Illness:  Twyla Garcia is a 64 y.o. female who is a retired  but enjoys gardening and does winter in [place].  She is a very nice patient of Dr. Trang Bailey who is being seen today in kind consultation from Dr. Trang Bailey  for evaluation of pain to her hips.  The patient presents today bilateral hip pain.  She states that she fell down the steps 3 months ago in September 2023..  She states after falling down 8 steps the pain has been getting worse.  She states the pain is specifically worse when she is getting up out of a chair and walking or going up steps.  She states getting up out of a chair/walking is a 6-8 out of 10 while going up steps is immediately 9 out of 10 pain.  She states as long as she is sitting she is fine.  She states when she gets up and starts walking she has difficulty but it does loosen up over time.  However if she walks for too long she then gets weak and has trouble continuing to walk.  She states this pain is different than her normal back pain is located specifically in her thighs.  She states that her back pain usually starts higher up and then radiates down that does not with this feeling.  She states she does not usually wake up at night with this as long as she is keeping warm and no one touches her.  However when rolling over on her hips she does describe a 3-4 out of 10 on a pain scale.  She is being seen today for orthopedic and sports with initial imaging.    We saw Twyla in the office on 12/14/2023 prior to them leaving for winter further in did very well for about 4 months but states that since late May 2024

## 2025-01-16 ENCOUNTER — OFFICE VISIT (OUTPATIENT)
Dept: ORTHOPEDIC SURGERY | Age: 65
End: 2025-01-16

## 2025-01-16 VITALS — WEIGHT: 139 LBS | BODY MASS INDEX: 29.99 KG/M2 | HEIGHT: 57 IN

## 2025-01-16 DIAGNOSIS — M16.12 PRIMARY OSTEOARTHRITIS OF LEFT HIP: Primary | ICD-10-CM

## 2025-01-16 DIAGNOSIS — M25.559 HIP PAIN, UNSPECIFIED LATERALITY: ICD-10-CM

## 2025-01-16 SDOH — HEALTH STABILITY: PHYSICAL HEALTH
ON AVERAGE, HOW MANY DAYS PER WEEK DO YOU ENGAGE IN MODERATE TO STRENUOUS EXERCISE (LIKE A BRISK WALK)?: PATIENT DECLINED

## 2025-01-16 NOTE — PROGRESS NOTES
1/16/25  3:15 PM        NDC: 38743-857-33   -   Ropivacaine 0.5 %    LOT: [id number]    COMMENT: LEFT HIP INTRA-ARTICULAR CORTISONE INJECTION      NDC: 4983-0707-16   -   Depo Medrol 40mg    LOT: [id number]    COMMENT: LEFT HIP INTRA-ARTICULAR CORTISONE INJECTION        
major bony prominences  full range of motion  Flexion arc 0 to 120 degrees flexion arc, IR 15 degrees, ER 40 degrees   Deep Flexion test negative  FADIR Negative  TEO Positive  Resisted Abduction 4/5   Resisted Adduction 5/5   Resisted  Flexion 5/5  Athletic Pubalgia: negative   moderate tender at greater trochanter.  Tenderness along the gluteus medius as well.  Leg Length: equal    Prone: Absent  hip flexion contracture  Non tender to the proximal hamstring   Non tender to the lumbar spine or sacro-iliac joints    Distal Neurovascular exam is intact (foot sensation, pulses, and motor exam)      Beighton Score: Deferred/9 (>= 4 indicates joint hypermobility)              1. Passively touch the forearm with the thumb, while flexing the wrist               2. More than 10? hyperextension of the elbows              3. Knees hyperextension greater than or equal to 10? (genu-recurvatum)              4. Passive extension of the fingers or a 90? or more extension of the fifth finger              5. Touching the floor with the palms of the hands when reaching down without bending the knees       Diagnostics:  Radiology:       Pertinent imaging reviewed, report only - no images available    newRadiographs were obtained and reviewed in the office; 3 views: AP Pelvis and bilateral hip 45-deg Tilley View, and bilateral False Profile View, along with 2 views of the lumbar spine (AP and lateral standing).     Tonnis Grade: grade 1  LCEA:  within an acceptable range  Alpha Angle: 60 deg     Cross over-sign is negative  Other:  Negative Coxa Profunda, Negative Protrusio  Impression: no acute findings regarding any fractures, loose bodies, or dislocation.   There is  Left CAM JENNIFER without subspine impingement.    MR Left Hip w/o Contrast - 1/7/2025     1. There is tendinosis, peritendinitis and a small partial-thickness tear of the anterior aspect of the left gluteus medius tendon at its insertion on the left greater trochanter.

## 2025-01-20 ENCOUNTER — TELEPHONE (OUTPATIENT)
Dept: ORTHOPEDIC SURGERY | Age: 65
End: 2025-01-20

## 2025-01-20 NOTE — TELEPHONE ENCOUNTER
Surgery and/or Procedure Scheduling     Contact Name: DONAL REICH  Surgical/Procedure Request: LEFT HIP  Patient Contact Number: [phone]

## 2025-01-22 DIAGNOSIS — S76.019A RUPTURE OF TENDON OF HIP ABDUCTOR: ICD-10-CM

## 2025-01-22 DIAGNOSIS — Z01.818 PREOPERATIVE CLEARANCE: ICD-10-CM

## 2025-01-22 DIAGNOSIS — M70.62 GREATER TROCHANTERIC BURSITIS OF LEFT HIP: Primary | ICD-10-CM

## 2025-01-24 ENCOUNTER — PREP FOR PROCEDURE (OUTPATIENT)
Dept: ORTHOPEDIC SURGERY | Age: 65
End: 2025-01-24

## 2025-01-24 DIAGNOSIS — M70.62 TROCHANTERIC BURSITIS OF LEFT HIP: ICD-10-CM

## 2025-01-24 DIAGNOSIS — S76.019A RUPTURE OF TENDON OF HIP ABDUCTOR: ICD-10-CM

## 2025-01-28 DIAGNOSIS — S76.019A RUPTURE OF TENDON OF HIP ABDUCTOR: ICD-10-CM

## 2025-01-28 DIAGNOSIS — M70.62 TROCHANTERIC BURSITIS OF LEFT HIP: Primary | ICD-10-CM

## 2025-01-30 RX ORDER — DEXAMETHASONE SODIUM PHOSPHATE 10 MG/ML
8 INJECTION, SOLUTION INTRAMUSCULAR; INTRAVENOUS ONCE
Status: CANCELLED | OUTPATIENT
Start: 2025-02-14 | End: 2025-01-30

## 2025-01-30 RX ORDER — SODIUM CHLORIDE 0.9 % (FLUSH) 0.9 %
5-40 SYRINGE (ML) INJECTION PRN
Status: CANCELLED | OUTPATIENT
Start: 2025-02-14

## 2025-01-30 RX ORDER — SODIUM CHLORIDE 9 MG/ML
INJECTION, SOLUTION INTRAVENOUS PRN
Status: CANCELLED | OUTPATIENT
Start: 2025-02-14

## 2025-01-30 RX ORDER — SODIUM CHLORIDE 0.9 % (FLUSH) 0.9 %
5-40 SYRINGE (ML) INJECTION EVERY 12 HOURS SCHEDULED
Status: CANCELLED | OUTPATIENT
Start: 2025-02-14

## 2025-02-06 ENCOUNTER — HOSPITAL ENCOUNTER (OUTPATIENT)
Dept: PHYSICAL THERAPY | Age: 65
Setting detail: THERAPIES SERIES
Discharge: HOME | End: 2025-02-06
Attending: ORTHOPAEDIC SURGERY
Payer: MEDICARE

## 2025-02-06 ENCOUNTER — OFFICE VISIT (OUTPATIENT)
Dept: ORTHOPEDIC SURGERY | Age: 65
End: 2025-02-06

## 2025-02-06 VITALS — WEIGHT: 139 LBS | BODY MASS INDEX: 29.99 KG/M2 | HEIGHT: 57 IN

## 2025-02-06 DIAGNOSIS — S76.019A RUPTURE OF TENDON OF HIP ABDUCTOR: Primary | ICD-10-CM

## 2025-02-06 DIAGNOSIS — M70.62 TROCHANTERIC BURSITIS OF LEFT HIP: ICD-10-CM

## 2025-02-06 DIAGNOSIS — M16.12 PRIMARY OSTEOARTHRITIS OF LEFT HIP: ICD-10-CM

## 2025-02-06 PROCEDURE — 97110 THERAPEUTIC EXERCISES: CPT

## 2025-02-06 PROCEDURE — 97161 PT EVAL LOW COMPLEX 20 MIN: CPT

## 2025-02-06 RX ORDER — SENNOSIDES 8.6 MG
1 TABLET ORAL 2 TIMES DAILY
Qty: 14 TABLET | Refills: 0 | Status: SHIPPED | OUTPATIENT
Start: 2025-02-06 | End: 2025-02-13

## 2025-02-06 RX ORDER — HYDROCODONE BITARTRATE AND ACETAMINOPHEN 5; 325 MG/1; MG/1
1 TABLET ORAL EVERY 4 HOURS PRN
Qty: 20 TABLET | Refills: 0 | Status: SHIPPED | OUTPATIENT
Start: 2025-02-06 | End: 2025-02-11

## 2025-02-06 RX ORDER — CYCLOBENZAPRINE HCL 10 MG
10 TABLET ORAL 3 TIMES DAILY PRN
Qty: 21 TABLET | Refills: 0 | Status: SHIPPED | OUTPATIENT
Start: 2025-02-06 | End: 2025-02-13

## 2025-02-06 RX ORDER — NAPROXEN 500 MG/1
500 TABLET ORAL 2 TIMES DAILY WITH MEALS
Qty: 28 TABLET | Refills: 0 | Status: CANCELLED | OUTPATIENT
Start: 2025-02-06 | End: 2025-02-20

## 2025-02-06 RX ORDER — ASPIRIN 81 MG/1
81 TABLET ORAL 2 TIMES DAILY
Qty: 28 TABLET | Refills: 0 | Status: SHIPPED | OUTPATIENT
Start: 2025-02-06 | End: 2025-02-20

## 2025-02-06 NOTE — H&P (VIEW-ONLY)
Date:  2025    Name:  Twyla Garcia  Address:  [address] [place] [address] [place] [address]    :  1960      Age:   64 y.o.    SSN:  [id number]      Medical Record Number:  [id number]    Reason for Visit:    Chief Complaint    Hip Pain (Preop left open abductor)      DOS:2025     HPI: Twyla Garcia is a 64 y.o. female here today for a preoperative evaluation for open left hip abductor repair and bursectomy on 25.  Patient has a history of peptic ulcer disease on PPI NSAIDs are contraindicated.  She tolerates ASA 81 mg.  Patient reports that she quit smoking cigarettes in 2025.    Prior HPI:  Twyla Garcia is a 64 y.o. female here today for  evaluation of left lateral sided hip pain that has been on going for 2 year(s).  She does recall a fall on her left hip that she had 2 years ago.  She was walking around the pond when she fell and landed on a rock directly on the lateral aspect of her hip.  She has since then had pain along the lateral aspect of her hip.  The pain has been fluctuating depending on her activity level and painkillers.  She initially was able to manage her pain with conservative measures but the pain was getting worse.  She was seen by Dr. Bailey who has been managing her for GT bursitis.  She has had 2 prior injections along the lateral aspect of her thigh that has helped her symptoms.  The last one she had 1 month ago that relieved her symptoms for 3 weeks only.  Patient had an MRI performed showing an abductor tendon tear.  She is presenting here today to discuss the treatment options available.     Pain Assessment  Location of Pain: Hip  Location Modifiers: Left  Severity of Pain: 4  Quality of Pain: Sharp  Frequency of Pain: Constant  Aggravating Factors: Walking, Stairs  Limiting Behavior: Yes  Relieving Factors: Rest  Result of Injury: No  Work-Related Injury: No  ROS: Review of systems reviewed from Patient History Form completed today and available in the patient's  chart under the Media tab.       Past Medical History:   Diagnosis Date    Back pain     Migraines     PAF (paroxysmal atrial fibrillation) (HCC) 2020    Thyroid disease     goiter 2010        Past Surgical History:   Procedure Laterality Date    APPENDECTOMY      BACK INJECTION Bilateral 09/09/2022    BILATERAL SACROILIAC JOINT INJECTION performed by Fidelia Eid MD at [facility] ENDOSCOPY    BLEPHAROPLASTY Bilateral 10/3/2022    BLEPHAROPLASTY - BILATERAL UPPER LID performed by Tessa Morton MD at [facility]    CARPAL TUNNEL RELEASE Bilateral     COLONOSCOPY      HYSTERECTOMY (CERVIX STATUS UNKNOWN)      NERVE BLOCK Left 08/05/2022    LEFT SCIATIC NERVE BLOCK performed by Fidelia Eid MD at [facility] ENDOSCOPY    NERVE BLOCK Right 08/12/2022    RIGHT SCIATIC NERVE BLOCK performed by Fidelia Eid MD at [facility] ENDOSCOPY    NERVE BLOCK Left 7/14/2023    LEFT SCIATIC NERVE BLOCK performed by Fidelia Eid MD at [facility] ENDOSCOPY    NERVE BLOCK Right 7/21/2023    RIGHT SCIATIC NERVE BLOCK performed by Fidelia Eid MD at [facility] ENDOSCOPY    PAIN MANAGEMENT PROCEDURE Left 07/22/2022    LUMBAR EPIDURAL STEROID INJECTION LEFT L4-5 performed by Fidelia Eid MD at [facility] ENDOSCOPY    PAIN MANAGEMENT PROCEDURE Right 10/28/2022    CERVICAL EPIDURAL STEROID INJECTION RIGHT C7-T1 performed by Fidelia Eid MD at [facility] ENDOSCOPY    PAIN MANAGEMENT PROCEDURE Bilateral 11/11/2022    BILATERAL SUPRASCAPULAR NERVE BLOCK performed by Fidelia Eid MD at [facility] ENDOSCOPY    PAIN MANAGEMENT PROCEDURE Left 4/28/2023    CERVICAL EPIDURAL STEROID INJECTION LEFT C7-T1 performed by Fidelia Eid MD at [facility] ENDOSCOPY    PAIN MANAGEMENT PROCEDURE Right 6/23/2023    LUMBAR EPIDURAL STEROID INJECTION RIGHT L4-5 performed by Fidelia Eid MD at [facility] ENDOSCOPY    US BIOPSY THYROID  12/12/2022    US THYROID BIOPSY 12/12/2022 [facility]

## 2025-02-06 NOTE — PROGRESS NOTES
Date:  2025    Name:  Twyla Garcia  Address:  [address] [place] [address] [place] [address]    :  1960      Age:   64 y.o.    SSN:  [id number]      Medical Record Number:  [id number]    Reason for Visit:    Chief Complaint    Hip Pain (Preop left open abductor)      DOS:2025     HPI: Twyla Garcia is a 64 y.o. female here today for a preoperative evaluation for open left hip abductor repair and bursectomy on 25.  Patient has a history of peptic ulcer disease on PPI NSAIDs are contraindicated.  She tolerates ASA 81 mg.  Patient reports that she quit smoking cigarettes in 2025.    Prior HPI:  Twyla Garcia is a 64 y.o. female here today for  evaluation of left lateral sided hip pain that has been on going for 2 year(s).  She does recall a fall on her left hip that she had 2 years ago.  She was walking around the pond when she fell and landed on a rock directly on the lateral aspect of her hip.  She has since then had pain along the lateral aspect of her hip.  The pain has been fluctuating depending on her activity level and painkillers.  She initially was able to manage her pain with conservative measures but the pain was getting worse.  She was seen by Dr. Bailey who has been managing her for GT bursitis.  She has had 2 prior injections along the lateral aspect of her thigh that has helped her symptoms.  The last one she had 1 month ago that relieved her symptoms for 3 weeks only.  Patient had an MRI performed showing an abductor tendon tear.  She is presenting here today to discuss the treatment options available.     Pain Assessment  Location of Pain: Hip  Location Modifiers: Left  Severity of Pain: 4  Quality of Pain: Sharp  Frequency of Pain: Constant  Aggravating Factors: Walking, Stairs  Limiting Behavior: Yes  Relieving Factors: Rest  Result of Injury: No  Work-Related Injury: No  ROS: Review of systems reviewed from Patient History Form completed today and available in the patient's

## 2025-02-06 NOTE — PLAN OF CARE
[facility]- Outpatient Rehabilitation and Therapy [address], [place] [address] office: [phone] fax: [phone]      Physical Therapy Re-Certification Plan of Care    Dear  Dr. Jolley,    We had the pleasure of treating the following patient for physical therapy services at [facility].  A summary of our findings can be found in the updated assessment below.  This includes our plan of care.  If you have any questions or concerns regarding these findings, please do not hesitate to contact me at [phone].  Thank you for the referral.     Physician Signature:________________________________Date:__________________  By signing above (or electronic signature), therapist’s plan is approved by physician      Overall Response to Treatment:   []Patient is responding well to treatment and improvement is noted with regards  to goals   []Patient should continue to improve in reasonable time if they continue HEP   []Patient has plateaued and is no longer responding to skilled PT intervention    []Patient is getting worse and would benefit from return to referring MD   []Patient unable to adhere to initial POC   [x]Other: Presented today for pre-op visit for upcoming left hip surgery. ROM maintained from initial evaluation in December and she does have improvement in glute strength with her current HEP. We reviewed PO precautions and restrictions including limited weight bearing. Discussed use of AD (walker or crutches) and how to ambulate safely with TTWBing as well as navigate stairs safely. Will start PO PT 5 days after surgery and plan for 2x/week for 12 weeks.     Date range of Visits: 24-25  Total Visits: 2        Physical Therapy: TREATMENT/PROGRESS NOTE   Patient: Twyla Garcia (64 y.o. female)   Examination Date: 2025   :  1960 MRN: [id number]   Visit #: 2 of 8  Insurance Allowable Auth Needed   BMN [x]Yes    []No    Insurance:

## 2025-02-10 RX ORDER — CLINDAMYCIN PHOSPHATE 10 MG/G
GEL TOPICAL 2 TIMES DAILY
COMMUNITY
Start: 2025-01-18

## 2025-02-10 RX ORDER — GABAPENTIN 300 MG/1
300 CAPSULE ORAL 3 TIMES DAILY
COMMUNITY
Start: 2025-01-14

## 2025-02-10 NOTE — PROGRESS NOTES
2/10/2025 1208 am:    TI COMPLETED/TS    H&p IN CE 2/3/2025, LABS IN [facility] 2/3/2025 AND CALLED [phone] EKG DONE AT [facility] 12/20/24/TS

## 2025-02-10 NOTE — PROGRESS NOTES
2/10/2025 1205 pm:    PRESURGICAL BATHING INSTRUCTIONS  The [facility] takes many steps to prevent infections during surgery. One way is to provide   you with 4% Chlorhexidine Gluconate (CHG), a special antiseptic soap to wash your skin prior to   surgery. By thoroughly washing your skin, you can reduce the number of germs and help us   prevent an infection. Skin prep is a very important part of getting you ready for surgery. Please   follow the instructions listed below. Do NOT use if you have had an allergic reaction to CHG   previously.   Common Brand names:  Betasept, Hibiclens, Hibistat, Exidine, BioScrub, Mary Carmen-Hex, Peridex, Clorostat        Showering Steps  Before Your Procedure: Follow instructions from your surgeon for frequency of use prior to your surgery.     Wash your hair, face and body using your regular soap and shampoo.    Rinse your hair and body thoroughly to remove any soap or shampoo residue.  Turn the water off to prevent rinsing the antiseptic soap (CHG) off too soon.    Wash the body (neck down) gently for 5 minutes using a clean washcloth wet down with the CHG soap on it.    Apply the Chlorhexidine Gluconate (CHG) soap to your entire body only from the neck down. Use antibacterial soap for face and head (Dial or SafeGuard)  Do not use on your face, eyes, ears, hair or genital area to avoid permanent injury to those areas.  Do not scrub the skin too hard. Wash thoroughly paying special attention to the area   where the surgery or procedure will be done.   Do not wash body with regular soap once CHG is used.   Turn the water back on and rinse the body off thoroughly.  Pat yourself dry with a clean fresh towel after each shower..   Put on clean clothes after each shower.Do not put on lotions or powders after bathing.  Use antibacterial soap for face and head (Dial or SafeGuard). Do not use Chlorhexidine Gluconate (CHG) soap on face or head.   Follow instructions from surgeon for frequency

## 2025-02-10 NOTE — PROGRESS NOTES
2/10/2025 1207 pm:        [facility] PRE-SURGICAL TESTING INSTRUCTIONS                      PRIOR TO PROCEDURE DATE:    1. PLEASE FOLLOW ANY INSTRUCTIONS GIVEN TO YOU PER YOUR SURGEON.      2. Arrange for someone to drive you home and be with you for the first 24 hours after discharge for your safety after your procedure for which you received sedation. Ensure it is someone we can share information with regarding your discharge.     NOTE: At this time ONLY 2 ADULTS may accompany you. NO CHILDREN UNDER AGE OF 16.    One person ENCOURAGED to stay at hospital entire time if outpatient surgery      3. You must contact your surgeon for instructions IF:  You are taking any blood thinners, aspirin, anti-inflammatory or vitamins.   Contact your ordering physician/surgeon for prescription medication instructions as soon as possible, especially if taking blood thinners, aspirin, heart, or diabetic medication.   There is a change in your physical condition such as a cold, fever, rash, cuts, sores, or any other infection, especially near your surgical site.    4. Do not drink alcohol the day before or day of your procedure.  Do not use any marijuana at least 24 hours or street drugs (heroin, cocaine) at minimum 5 days prior to your procedure.     5. A Pre-Surgical History and Physical MUST be completed WITHIN 30 DAYS OR LESS prior to your procedure.by your Physician or an Urgent Care        THE DAY OF YOUR PROCEDURE:  1.  Follow instructions for ARRIVAL TIME as DIRECTED BY YOUR SURGEON. [place] [phone]     2. Enter the MAIN entrance from [facility] and follow the signs to the free Parking Garage or  Parking (offered free of charge 7 am-5pm).      3. Enter the Main Entrance of the hospital (do not enter from the lower level of the parking garage). Upon entrance, check in with the  at the surgical information desk on your LEFT.   Bring your insurance card  Telephone Encounter by Emily Crum RN at 01/19/18 04:00 PM     Author:  Emily Crum RN Service:  (none) Author Type:  Registered Nurse     Filed:  01/19/18 04:01 PM Encounter Date:  11/3/2017 Status:  Signed     :  Emily Crum RN (Registered Nurse)            Dr. Moon please advise message below for coding[EC1.1M]      Revision History        User Key Date/Time User Provider Type Action    > EC1.1 01/19/18 04:01 PM Emily Crum RN Registered Nurse Sign    M - Manual             toenail polish if foot/leg surgery), lotion, powders, or metal hairclips.   Do not shave or wax for 72 hours prior to procedure near your operative site. Shaving with a razor can irritate your skin and make it easier to develop an infection. On the day of your procedure, any hair that needs to be removed near the surgical site will be 'clipped' by a healthcare worker using a special clipper designed to avoid skin irritation.    8. Dentures, glasses, or contacts will need to be removed before your procedure. Bring cases for your glasses, contacts, dentures, or hearing aids to protect them while you are in surgery.      9. If you use a CPAP, please bring it with you on the day of your procedure.    10. If you use oxygen at home, please bring your oxygen tank with you to hospital..     11. We recommend that valuable personal belongings such as cash, cell phones, e-tablets, or jewelry, be left at home during your stay. The hospital will not be responsible for valuables that are not secured in the hospital safe. However, if your insurance requires a co-pay, you may want to bring a method of payment, i.e., Check or credit card, if you wish to pay your co-pay the day of surgery.      12. If you are to stay overnight, you may bring a bag with personal items. Please have any large items you may need brought in by your family after your arrival to your hospital room.    13. If you have a Living Will or Durable Power of , please bring a copy on the day of your procedure.     How we keep you safe and work to prevent surgical site infections:   1. Health care workers should always check your ID bracelet to verify your name and birth date. You will be asked many times to state your name, date of birth, and allergies.    2. Health care workers should always clean their hands with soap or alcohol gel before providing care to you. It is okay to ask anyone if they cleaned their hands before they touch you.    3. You will be

## 2025-02-12 ENCOUNTER — TELEPHONE (OUTPATIENT)
Dept: ORTHOPEDIC SURGERY | Age: 65
End: 2025-02-12

## 2025-02-12 NOTE — TELEPHONE ENCOUNTER
PHARMACY CALLING TO CLARIFY NORCO RX WRITTEN BY DR. GARCIA.  PATIENT ALSO RECEIVED 30 TRAMODOL WRITTEN ON 2/6 BUT JUST BROUGHT TO FILL TODAY.  PLEASE CALL TO DISCUSS  [phone].

## 2025-02-12 NOTE — TELEPHONE ENCOUNTER
General Question     Subject: MEDICATION QUESTIONS   Patient and /or Facility Request: Twyla Garcia   Contact Number: [phone]     PATIENT REQUESTING A CALL BACK WANTING TO KNOW IF DR KIMBROUGH IS GOING TO AUTHORIZE HER PRESCRIPTIONS FOR SURGERY ON FRIDAY. PATIENT STATES THAT THE PHARMACY CALLED AND DIDN'T RECOGNIZE THE PHYSICIAN THAT SENT THE MEDICATION IN      PLEASE CALL THE PATIENT BACK AT THE ABOVE NUMBER

## 2025-02-12 NOTE — TELEPHONE ENCOUNTER
L/m on v/m for pharmacy regarding Norco script.  Verbally told to fill medication as it is for post-op care.  Sx date 2/14/25.  Informed pharmacy that our staff did not prescribe Tramadol.  They were asked to call back with further questions.

## 2025-02-12 NOTE — TELEPHONE ENCOUNTER
General Question     Subject: CALL PHARMACY BACK  Patient and /or Facility Request: Twyla Garcia   Contact Number: [phone]     THE [facility]Y IN [place] CALLED NEEDING VERIFICATION ON A SCRIPT THAT WAS DROPPED OFF BY THE PATIENT THE OTHER DAY.  THE SCRIPT WAS WRITTEN BY MELYSSA GARCIA ??      AND SINCE THE PT IS HAVING SX ON FRIDAY WITH DR KIMBROUGH, THEY WOULD LIKE SOMEONE TO CALL BACK ABOUT OTHER PRESCRIPTIONS AS WELL    PLEASE CALL [place] [phone]

## 2025-02-13 NOTE — DISCHARGE INSTRUCTIONS
Dr. Jr Jolley MD [facility]  Hip Preservation & Sports Medicine Surgeon   [facility]          POST-OPERATIVE INSTRUCTIONS:     Apply ice (over your dressing) for 4-6 weeks after surgery to help reduce swelling.    This can be applied to the affected area 20 minutes out of every hour while you are awake.     Leave your waterproof dressing in place for 7 days. After 7 days, you may change to the new waterproof bandage supplied for you. Please change your bandage sooner if it becomes saturated.     Please take all of your post-operative medications as prescribed.  Please do not alter how you take these medications without contacting the physician.  If you have any questions and/or concerns about your post-operative medications, please call our office.    Please do not take any additional Tylenol while you are taking the Norco.  This medication already contains Tylenol and taking additional Tylenol may cause liver damage.    It is okay to use Tylenol once you are no longer taking the Norco.    It is common to feel drowsy while taking pain medication.  Do not drink alcohol or drive while taking pain medication.    Nausea is also a common side effect of using pain medication.  If this occurs, try taking your medication with food.  Also drink plenty of water while taking the pain medication. You may use an over the counter anti-nausea as needed.  If nausea becomes severe, please contact the office and a prescription for Zofran may be prescribed.    To optimize your pain control, you can take your pain medication as prescribed for 2 days, then gradually taper off over the next day as tolerable.  If you feel your pain is not well controlled or begins to worsen following your first post-operative visit, please contact our office.   You will also need take a daily aspirin.  This will help prevent blood clots.       Please keep your dressings/wounds dry at all times.  Do not swim in pools, lakes, etc. until  medications to your primary care physician on your next visit. Keep your med list updated and carry it with in case of emergencies.    [x] Narcotic pain medications can cause the side effect of significant constipation.  You may want to add a stool softener to your postoperative medication schedule or speak to your surgeon on how best to manage this side effect.    NARCOTIC SAFETY:  Your pain medicine is only for you to take.  Safely store your medicines.  Store pills up high and out of reach of children and pets.  Ensure safety caps are snapped tightly  Keep track of how many pills you have left    Unused medication can be disposed of by taking them to a drop-off box or take-back program that is authorized by the [facility].  Access to a site near you can be found on the [employer] website (deadiversion[employer].gov).    If you have a CPAP machine, it is very important that you use it daily during all periods of sleep and daytime rest during your recovery at home.  Surgery and Anesthesia place a significant amount of stress on your body.  Using your CPAP will help keep you safe and lessen the negative effects of that stress.    FOLLOW-UP RECOVERY CARE:  [x]Call the office at [phone] for follow-up appointment and problems    Watch for these possible complications, symptoms, or side effects of anesthesia.  Call physician if they or any other problems occur:  Signs of INFECTION   > Fever over 101°     > Redness, swelling, hardness or warmth at the operative site   >Foul smelling or cloudy drainage at the operative site   Unrelieved PAIN  Unrelieved NAUSEA  Blood soaked dressing.  (Some oozing may be normal)  Inability to urinate      Numb, pale, blue, cold or tingling extremity      Physician:  Jr Tabor    The above instructions were reviewed with patient/significant other.  The following additional patient specific information was reviewed with the patient/significant

## 2025-02-14 ENCOUNTER — HOSPITAL ENCOUNTER (OUTPATIENT)
Age: 65
Setting detail: OUTPATIENT SURGERY
Discharge: HOME | End: 2025-02-14
Attending: ORTHOPAEDIC SURGERY | Admitting: ORTHOPAEDIC SURGERY
Payer: MEDICARE

## 2025-02-14 ENCOUNTER — ANESTHESIA (OUTPATIENT)
Dept: OPERATING ROOM | Age: 65
End: 2025-02-14
Payer: MEDICARE

## 2025-02-14 ENCOUNTER — ANESTHESIA EVENT (OUTPATIENT)
Dept: OPERATING ROOM | Age: 65
End: 2025-02-14
Payer: MEDICARE

## 2025-02-14 VITALS
TEMPERATURE: 97.9 F | SYSTOLIC BLOOD PRESSURE: 135 MMHG | WEIGHT: 144 LBS | HEART RATE: 93 BPM | DIASTOLIC BLOOD PRESSURE: 94 MMHG | BODY MASS INDEX: 31.07 KG/M2 | OXYGEN SATURATION: 94 % | RESPIRATION RATE: 16 BRPM | HEIGHT: 57 IN

## 2025-02-14 LAB
INR PPP: 0.97 (ref 0.85–1.15)
PROTHROMBIN TIME: 13.1 SEC (ref 11.9–14.9)

## 2025-02-14 PROCEDURE — 6360000002 HC RX W HCPCS: Performed by: ORTHOPAEDIC SURGERY

## 2025-02-14 PROCEDURE — 85610 PROTHROMBIN TIME: CPT

## 2025-02-14 PROCEDURE — C1713 ANCHOR/SCREW BN/BN,TIS/BN: HCPCS | Performed by: ORTHOPAEDIC SURGERY

## 2025-02-14 PROCEDURE — 2580000003 HC RX 258: Performed by: ANESTHESIOLOGY

## 2025-02-14 PROCEDURE — 6370000000 HC RX 637 (ALT 250 FOR IP): Performed by: ANESTHESIOLOGY

## 2025-02-14 PROCEDURE — 7100000010 HC PHASE II RECOVERY - FIRST 15 MIN: Performed by: ORTHOPAEDIC SURGERY

## 2025-02-14 PROCEDURE — 2500000003 HC RX 250 WO HCPCS: Performed by: ORTHOPAEDIC SURGERY

## 2025-02-14 PROCEDURE — 6360000002 HC RX W HCPCS: Performed by: ANESTHESIOLOGY

## 2025-02-14 PROCEDURE — 3600000014 HC SURGERY LEVEL 4 ADDTL 15MIN: Performed by: ORTHOPAEDIC SURGERY

## 2025-02-14 PROCEDURE — 64447 NJX AA&/STRD FEMORAL NRV IMG: CPT | Performed by: ANESTHESIOLOGY

## 2025-02-14 PROCEDURE — 6360000002 HC RX W HCPCS

## 2025-02-14 PROCEDURE — 2580000003 HC RX 258: Performed by: ORTHOPAEDIC SURGERY

## 2025-02-14 PROCEDURE — 3700000000 HC ANESTHESIA ATTENDED CARE: Performed by: ORTHOPAEDIC SURGERY

## 2025-02-14 PROCEDURE — 7100000011 HC PHASE II RECOVERY - ADDTL 15 MIN: Performed by: ORTHOPAEDIC SURGERY

## 2025-02-14 PROCEDURE — 2709999900 HC NON-CHARGEABLE SUPPLY: Performed by: ORTHOPAEDIC SURGERY

## 2025-02-14 PROCEDURE — 2500000003 HC RX 250 WO HCPCS

## 2025-02-14 PROCEDURE — 7100000001 HC PACU RECOVERY - ADDTL 15 MIN: Performed by: ORTHOPAEDIC SURGERY

## 2025-02-14 PROCEDURE — 7100000000 HC PACU RECOVERY - FIRST 15 MIN: Performed by: ORTHOPAEDIC SURGERY

## 2025-02-14 PROCEDURE — 3700000001 HC ADD 15 MINUTES (ANESTHESIA): Performed by: ORTHOPAEDIC SURGERY

## 2025-02-14 PROCEDURE — 2720000010 HC SURG SUPPLY STERILE: Performed by: ORTHOPAEDIC SURGERY

## 2025-02-14 PROCEDURE — 3600000004 HC SURGERY LEVEL 4 BASE: Performed by: ORTHOPAEDIC SURGERY

## 2025-02-14 DEVICE — ALPHAVENT SUTURE ANCHOR 4.75MM PEEK, SUTURE ANCHOR WITH 2 STRANDS 1.8MM XBRAID TT SUTURE TAPE WITH MO-6 TAPERED NEEDLES
Type: IMPLANTABLE DEVICE | Site: HIP | Status: FUNCTIONAL
Brand: ALPHAVENT

## 2025-02-14 RX ORDER — SODIUM CHLORIDE 9 MG/ML
INJECTION, SOLUTION INTRAVENOUS PRN
Status: DISCONTINUED | OUTPATIENT
Start: 2025-02-14 | End: 2025-02-14 | Stop reason: HOSPADM

## 2025-02-14 RX ORDER — OXYCODONE HYDROCHLORIDE 5 MG/1
10 TABLET ORAL PRN
Status: COMPLETED | OUTPATIENT
Start: 2025-02-14 | End: 2025-02-14

## 2025-02-14 RX ORDER — SODIUM CHLORIDE 0.9 % (FLUSH) 0.9 %
5-40 SYRINGE (ML) INJECTION PRN
Status: DISCONTINUED | OUTPATIENT
Start: 2025-02-14 | End: 2025-02-14 | Stop reason: HOSPADM

## 2025-02-14 RX ORDER — HYDROMORPHONE HYDROCHLORIDE 2 MG/ML
INJECTION, SOLUTION INTRAMUSCULAR; INTRAVENOUS; SUBCUTANEOUS
Status: DISCONTINUED | OUTPATIENT
Start: 2025-02-14 | End: 2025-02-14 | Stop reason: SDUPTHER

## 2025-02-14 RX ORDER — MIDAZOLAM HYDROCHLORIDE 1 MG/ML
INJECTION, SOLUTION INTRAMUSCULAR; INTRAVENOUS
Status: DISCONTINUED | OUTPATIENT
Start: 2025-02-14 | End: 2025-02-14 | Stop reason: SDUPTHER

## 2025-02-14 RX ORDER — DEXAMETHASONE SODIUM PHOSPHATE 10 MG/ML
8 INJECTION, SOLUTION INTRAMUSCULAR; INTRAVENOUS ONCE
Status: COMPLETED | OUTPATIENT
Start: 2025-02-14 | End: 2025-02-14

## 2025-02-14 RX ORDER — MAGNESIUM HYDROXIDE 1200 MG/15ML
LIQUID ORAL CONTINUOUS PRN
Status: DISCONTINUED | OUTPATIENT
Start: 2025-02-14 | End: 2025-02-14 | Stop reason: HOSPADM

## 2025-02-14 RX ORDER — SODIUM CHLORIDE, SODIUM LACTATE, POTASSIUM CHLORIDE, CALCIUM CHLORIDE 600; 310; 30; 20 MG/100ML; MG/100ML; MG/100ML; MG/100ML
INJECTION, SOLUTION INTRAVENOUS CONTINUOUS
Status: DISCONTINUED | OUTPATIENT
Start: 2025-02-14 | End: 2025-02-14 | Stop reason: HOSPADM

## 2025-02-14 RX ORDER — FENTANYL CITRATE 50 UG/ML
INJECTION, SOLUTION INTRAMUSCULAR; INTRAVENOUS
Status: DISCONTINUED | OUTPATIENT
Start: 2025-02-14 | End: 2025-02-14 | Stop reason: SDUPTHER

## 2025-02-14 RX ORDER — FENTANYL CITRATE 50 UG/ML
INJECTION, SOLUTION INTRAMUSCULAR; INTRAVENOUS
Status: COMPLETED
Start: 2025-02-14 | End: 2025-02-14

## 2025-02-14 RX ORDER — PROPOFOL 10 MG/ML
INJECTION, EMULSION INTRAVENOUS
Status: DISCONTINUED | OUTPATIENT
Start: 2025-02-14 | End: 2025-02-14 | Stop reason: SDUPTHER

## 2025-02-14 RX ORDER — ONDANSETRON 2 MG/ML
INJECTION INTRAMUSCULAR; INTRAVENOUS
Status: DISCONTINUED | OUTPATIENT
Start: 2025-02-14 | End: 2025-02-14 | Stop reason: SDUPTHER

## 2025-02-14 RX ORDER — PROCHLORPERAZINE EDISYLATE 5 MG/ML
5 INJECTION INTRAMUSCULAR; INTRAVENOUS
Status: DISCONTINUED | OUTPATIENT
Start: 2025-02-14 | End: 2025-02-14 | Stop reason: HOSPADM

## 2025-02-14 RX ORDER — SODIUM CHLORIDE 0.9 % (FLUSH) 0.9 %
5-40 SYRINGE (ML) INJECTION EVERY 12 HOURS SCHEDULED
Status: DISCONTINUED | OUTPATIENT
Start: 2025-02-14 | End: 2025-02-14 | Stop reason: HOSPADM

## 2025-02-14 RX ORDER — MIDAZOLAM HYDROCHLORIDE 1 MG/ML
INJECTION, SOLUTION INTRAMUSCULAR; INTRAVENOUS
Status: COMPLETED
Start: 2025-02-14 | End: 2025-02-14

## 2025-02-14 RX ORDER — ROCURONIUM BROMIDE 10 MG/ML
INJECTION, SOLUTION INTRAVENOUS
Status: DISCONTINUED | OUTPATIENT
Start: 2025-02-14 | End: 2025-02-14 | Stop reason: SDUPTHER

## 2025-02-14 RX ORDER — METHOCARBAMOL 100 MG/ML
INJECTION, SOLUTION INTRAMUSCULAR; INTRAVENOUS
Status: DISCONTINUED | OUTPATIENT
Start: 2025-02-14 | End: 2025-02-14 | Stop reason: SDUPTHER

## 2025-02-14 RX ORDER — BUPIVACAINE HYDROCHLORIDE 5 MG/ML
INJECTION, SOLUTION EPIDURAL; INTRACAUDAL
Status: DISCONTINUED | OUTPATIENT
Start: 2025-02-14 | End: 2025-02-14 | Stop reason: SDUPTHER

## 2025-02-14 RX ORDER — LABETALOL HYDROCHLORIDE 5 MG/ML
10 INJECTION, SOLUTION INTRAVENOUS
Status: DISCONTINUED | OUTPATIENT
Start: 2025-02-14 | End: 2025-02-14 | Stop reason: HOSPADM

## 2025-02-14 RX ORDER — GLYCOPYRROLATE 0.2 MG/ML
INJECTION INTRAMUSCULAR; INTRAVENOUS
Status: DISCONTINUED | OUTPATIENT
Start: 2025-02-14 | End: 2025-02-14 | Stop reason: SDUPTHER

## 2025-02-14 RX ORDER — DEXAMETHASONE SODIUM PHOSPHATE 4 MG/ML
INJECTION, SOLUTION INTRA-ARTICULAR; INTRALESIONAL; INTRAMUSCULAR; INTRAVENOUS; SOFT TISSUE
Status: DISCONTINUED | OUTPATIENT
Start: 2025-02-14 | End: 2025-02-14 | Stop reason: SDUPTHER

## 2025-02-14 RX ORDER — NALOXONE HYDROCHLORIDE 0.4 MG/ML
INJECTION, SOLUTION INTRAMUSCULAR; INTRAVENOUS; SUBCUTANEOUS PRN
Status: DISCONTINUED | OUTPATIENT
Start: 2025-02-14 | End: 2025-02-14 | Stop reason: HOSPADM

## 2025-02-14 RX ORDER — OXYCODONE HYDROCHLORIDE 5 MG/1
5 TABLET ORAL PRN
Status: COMPLETED | OUTPATIENT
Start: 2025-02-14 | End: 2025-02-14

## 2025-02-14 RX ORDER — FENTANYL CITRATE 50 UG/ML
25 INJECTION, SOLUTION INTRAMUSCULAR; INTRAVENOUS EVERY 5 MIN PRN
Status: DISCONTINUED | OUTPATIENT
Start: 2025-02-14 | End: 2025-02-14 | Stop reason: HOSPADM

## 2025-02-14 RX ORDER — LIDOCAINE HYDROCHLORIDE 20 MG/ML
INJECTION, SOLUTION INTRAVENOUS
Status: DISCONTINUED | OUTPATIENT
Start: 2025-02-14 | End: 2025-02-14 | Stop reason: SDUPTHER

## 2025-02-14 RX ORDER — BUPIVACAINE HYDROCHLORIDE 5 MG/ML
INJECTION, SOLUTION EPIDURAL; INTRACAUDAL
Status: COMPLETED
Start: 2025-02-14 | End: 2025-02-14

## 2025-02-14 RX ORDER — HYDROMORPHONE HYDROCHLORIDE 1 MG/ML
0.5 INJECTION, SOLUTION INTRAMUSCULAR; INTRAVENOUS; SUBCUTANEOUS EVERY 5 MIN PRN
Status: DISCONTINUED | OUTPATIENT
Start: 2025-02-14 | End: 2025-02-14 | Stop reason: HOSPADM

## 2025-02-14 RX ADMIN — DEXAMETHASONE SODIUM PHOSPHATE 8 MG: 10 INJECTION, SOLUTION INTRAMUSCULAR; INTRAVENOUS at 12:32

## 2025-02-14 RX ADMIN — BUPIVACAINE HYDROCHLORIDE 30 ML: 5 INJECTION, SOLUTION EPIDURAL; INTRACAUDAL; PERINEURAL at 12:25

## 2025-02-14 RX ADMIN — WATER 2000 MG: 1 INJECTION INTRAMUSCULAR; INTRAVENOUS; SUBCUTANEOUS at 14:40

## 2025-02-14 RX ADMIN — HYDROMORPHONE HYDROCHLORIDE 1 MG: 2 INJECTION, SOLUTION INTRAMUSCULAR; INTRAVENOUS; SUBCUTANEOUS at 15:14

## 2025-02-14 RX ADMIN — ONDANSETRON 4 MG: 2 INJECTION INTRAMUSCULAR; INTRAVENOUS at 14:40

## 2025-02-14 RX ADMIN — SODIUM CHLORIDE, POTASSIUM CHLORIDE, SODIUM LACTATE AND CALCIUM CHLORIDE: 600; 310; 30; 20 INJECTION, SOLUTION INTRAVENOUS at 12:17

## 2025-02-14 RX ADMIN — HYDROMORPHONE HYDROCHLORIDE 0.5 MG: 1 INJECTION, SOLUTION INTRAMUSCULAR; INTRAVENOUS; SUBCUTANEOUS at 16:26

## 2025-02-14 RX ADMIN — DEXAMETHASONE SODIUM PHOSPHATE 4 MG: 4 INJECTION INTRA-ARTICULAR; INTRALESIONAL; INTRAMUSCULAR; INTRAVENOUS; SOFT TISSUE at 14:40

## 2025-02-14 RX ADMIN — PROPOFOL 50 MG: 10 INJECTION, EMULSION INTRAVENOUS at 14:26

## 2025-02-14 RX ADMIN — LIDOCAINE HYDROCHLORIDE 50 MG: 20 INJECTION, SOLUTION INTRAVENOUS at 14:22

## 2025-02-14 RX ADMIN — METHOCARBAMOL 1000 MG: 100 INJECTION INTRAMUSCULAR; INTRAVENOUS at 15:05

## 2025-02-14 RX ADMIN — HYDROMORPHONE HYDROCHLORIDE 0.5 MG: 1 INJECTION, SOLUTION INTRAMUSCULAR; INTRAVENOUS; SUBCUTANEOUS at 16:30

## 2025-02-14 RX ADMIN — SODIUM CHLORIDE, POTASSIUM CHLORIDE, SODIUM LACTATE AND CALCIUM CHLORIDE: 600; 310; 30; 20 INJECTION, SOLUTION INTRAVENOUS at 15:05

## 2025-02-14 RX ADMIN — MIDAZOLAM HYDROCHLORIDE 2 MG: 1 INJECTION, SOLUTION INTRAMUSCULAR; INTRAVENOUS at 12:25

## 2025-02-14 RX ADMIN — GLYCOPYRROLATE 0.2 MG: 0.2 INJECTION INTRAMUSCULAR; INTRAVENOUS at 16:01

## 2025-02-14 RX ADMIN — PROPOFOL 150 MG: 10 INJECTION, EMULSION INTRAVENOUS at 14:22

## 2025-02-14 RX ADMIN — OXYCODONE 5 MG: 5 TABLET ORAL at 17:16

## 2025-02-14 RX ADMIN — SUGAMMADEX 200 MG: 100 INJECTION, SOLUTION INTRAVENOUS at 16:03

## 2025-02-14 RX ADMIN — TRANEXAMIC ACID 1000 MG: 100 INJECTION, SOLUTION INTRAVENOUS at 14:41

## 2025-02-14 RX ADMIN — FENTANYL CITRATE 25 MCG: 50 INJECTION, SOLUTION INTRAMUSCULAR; INTRAVENOUS at 16:26

## 2025-02-14 RX ADMIN — HYDROMORPHONE HYDROCHLORIDE 0.5 MG: 2 INJECTION, SOLUTION INTRAMUSCULAR; INTRAVENOUS; SUBCUTANEOUS at 14:56

## 2025-02-14 RX ADMIN — FENTANYL CITRATE 25 MCG: 50 INJECTION INTRAMUSCULAR; INTRAVENOUS at 16:26

## 2025-02-14 RX ADMIN — HYDROMORPHONE HYDROCHLORIDE 0.5 MG: 2 INJECTION, SOLUTION INTRAMUSCULAR; INTRAVENOUS; SUBCUTANEOUS at 15:02

## 2025-02-14 RX ADMIN — FENTANYL CITRATE 100 MCG: 50 INJECTION, SOLUTION INTRAMUSCULAR; INTRAVENOUS at 12:25

## 2025-02-14 RX ADMIN — ROCURONIUM BROMIDE 70 MG: 10 INJECTION, SOLUTION INTRAVENOUS at 14:22

## 2025-02-14 ASSESSMENT — PAIN DESCRIPTION - FREQUENCY: FREQUENCY: CONTINUOUS

## 2025-02-14 ASSESSMENT — PAIN SCALES - GENERAL
PAINLEVEL_OUTOF10: 6
PAINLEVEL_OUTOF10: 5
PAINLEVEL_OUTOF10: 7
PAINLEVEL_OUTOF10: 3

## 2025-02-14 ASSESSMENT — PAIN - FUNCTIONAL ASSESSMENT
PAIN_FUNCTIONAL_ASSESSMENT: ACTIVITIES ARE NOT PREVENTED
PAIN_FUNCTIONAL_ASSESSMENT: 0-10

## 2025-02-14 ASSESSMENT — PAIN DESCRIPTION - LOCATION
LOCATION: HIP

## 2025-02-14 ASSESSMENT — PAIN DESCRIPTION - ORIENTATION
ORIENTATION: LEFT

## 2025-02-14 ASSESSMENT — PAIN DESCRIPTION - DESCRIPTORS
DESCRIPTORS: DISCOMFORT
DESCRIPTORS: ACHING;DISCOMFORT
DESCRIPTORS: DISCOMFORT

## 2025-02-14 ASSESSMENT — LIFESTYLE VARIABLES: SMOKING_STATUS: 0

## 2025-02-14 ASSESSMENT — PAIN DESCRIPTION - PAIN TYPE: TYPE: SURGICAL PAIN

## 2025-02-14 ASSESSMENT — PAIN DESCRIPTION - ONSET: ONSET: ON-GOING

## 2025-02-14 NOTE — ANESTHESIA PROCEDURE NOTES
Peripheral Block    Patient location during procedure: pre-op  Reason for block: procedure for pain, post-op pain management and at surgeon's request  Start time: 2/14/2025 12:26 PM  End time: 2/14/2025 12:31 PM  Staffing  Performed: anesthesiologist   Performed by: Dina Valdes DO  Authorized by: Dina Valdes DO    Preanesthetic Checklist  Completed: patient identified, IV checked, site marked, risks and benefits discussed, surgical/procedural consents, equipment checked, pre-op evaluation, timeout performed, anesthesia consent given, oxygen available, monitors applied/VS acknowledged, fire risk safety assessment completed and verbalized and blood product R/B/A discussed and consented  Peripheral Block   Patient position: supine  Prep: ChloraPrep  Patient monitoring: cardiac monitor, continuous pulse ox, continuous capnometry, frequent blood pressure checks, IV access and oxygen  Block type: PENG  Laterality: left  Injection technique: single-shot  Guidance: ultrasound guided    Needle   Needle gauge: 22 G  Needle localization: anatomical landmarks and ultrasound guidance  Assessment   Injection assessment: negative aspiration for heme, no paresthesia on injection, local visualized surrounding nerve on ultrasound and no intravascular symptoms  Paresthesia pain: none  Slow fractionated injection: yes  Hemodynamics: stable  Outcomes: uncomplicated and patient tolerated procedure well    Additional Notes  Sterile prep. 2 mg versed + 100 micrograms fentanyl IV for pt comfort. 30 mL 0.5% Bupivacaine injected in 5 mL increments following negative aspiration. Tip of needle in view at all times. No pain or paresthesias on injection. Pt tolerated the procedure.

## 2025-02-14 NOTE — PROGRESS NOTES
Procedure: peripheral block  MD: Dr. Valdes  Timeout performed.  Pt monitored closely on heart monitor, 2L NC, continuous pulse oximetry, EtCO2, and frequent BPs.   Pt remained alert and oriented x4. pt tolerated procedure well.

## 2025-02-14 NOTE — PROGRESS NOTES
Ambulatory Surgery/Procedure Discharge Note    Vitals:    02/14/25 1707   BP: (!) 135/94   Pulse: 93   Resp: 16   Temp: 97.9 °F (36.6 °C)   SpO2: 94%     Patient meets criteria for discharge per scotty score  In: 1660 [I.V.:1600]  Out: 50     Restroom use offered before discharge.  Yes    Pain assessment:    Pain Level: 6    Pt and S.O./family states \"ready to go home\". Pt alert and oriented x4. IV removed. Denies N/V or pain. Dressing C,D,I.  Voided prior to discharge. Pt tolerating po intake. Discharge instructions given to pt and  Lopez with pt permission. Pt and family  verbalized understanding of all instructions. Left with all belongings, prescriptions, and discharge instructions.     Patient discharged to home/self care. Patient discharged via wheel chair by transporter to waiting family/S.O.       2/14/2025 5:26 PM

## 2025-02-14 NOTE — PROGRESS NOTES
Pt received from OR to PACU # 17 via stretcher.     Post: Procedure(s):  LEFT HIP OPEN ABDUCTOR REPAIR/BURSECTOMY     Report received from OR RN and NANCY Childers CRNA.    Per report pt did well, received a preop Nerve block.    Respirations reg and easy.  Pt is alert.       Attached to PACU monitoring system. Alarms and parameters set    Pain 5/10 and denies complaints of nausea.

## 2025-02-14 NOTE — INTERVAL H&P NOTE
Update History & Physical    The patient's History and Physical of February 6, 2025 was reviewed with the patient and I examined the patient. There was no change. The surgical site was confirmed by the patient and me.     Plan: The risks, benefits, expected outcome, and alternative to the recommended procedure have been discussed with the patient. Patient understands and wants to proceed with the procedure.     Electronically signed by Larry Payne MD on 2/14/2025 at 1:04 PM

## 2025-02-14 NOTE — ANESTHESIA PRE PROCEDURE
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 performed by Arthur Bowling MD at [facility]   • COLONOSCOPY     • HYSTERECTOMY (CERVIX STATUS UNKNOWN)     • NERVE BLOCK Left 08/05/2022    LEFT SCIATIC NERVE BLOCK performed by Fidelia Eid MD at [facility]   • NERVE BLOCK Right 08/12/2022    RIGHT SCIATIC NERVE BLOCK performed by Fidelia Eid MD at [facility]   • NERVE BLOCK Left 07/14/2023    LEFT SCIATIC NERVE BLOCK performed by Fidelia Eid MD at [facility]   • NERVE BLOCK Right 07/21/2023    RIGHT SCIATIC NERVE BLOCK performed by Fidelia Eid MD at [facility]   • PAIN MANAGEMENT PROCEDURE Left 07/22/2022    LUMBAR EPIDURAL STEROID INJECTION LEFT L4-5 performed by Fidelia Eid MD at [facility]   • PAIN MANAGEMENT PROCEDURE Right 10/28/2022    CERVICAL EPIDURAL STEROID INJECTION RIGHT C7-T1 performed by Fidelia Eid MD at [facility]   • PAIN MANAGEMENT PROCEDURE Bilateral 11/11/2022    BILATERAL SUPRASCAPULAR NERVE BLOCK performed by Fidelia Eid MD at [facility]   • PAIN MANAGEMENT PROCEDURE Left 04/28/2023    CERVICAL EPIDURAL STEROID INJECTION LEFT C7-T1 performed by Fidelia Eid MD at [facility]   • PAIN MANAGEMENT PROCEDURE Right 06/23/2023    LUMBAR EPIDURAL STEROID INJECTION RIGHT L4-5 performed by Fidelia Eid MD at [facility]   • US BIOPSY THYROID  12/12/2022    US THYROID BIOPSY 12/12/2022 [facility] ULTRASOUND   • US BIOPSY THYROID  08/29/2024    US THYROID BIOPSY 8/29/2024 [facility] ULTRASOUND       Social History:    Social History     Tobacco

## 2025-02-14 NOTE — PROGRESS NOTES
PACU Transfer to [facility] #5    Vitals:    02/14/25 1655   BP: (!) 140/90   Pulse: 99   Resp: 16   Temp: 97.8 °F (36.6 °C)   SpO2: 95%         Intake/Output Summary (Last 24 hours) at 2/14/2025 1701  Last data filed at 2/14/2025 1603  Gross per 24 hour   Intake 1660 ml   Output 50 ml   Net 1610 ml       Pain assessment:  present - adequately treated  Pain Level: 3    Patient transferred via stretcher per Mar RN to care of [facility] RN.

## 2025-02-14 NOTE — ANESTHESIA POSTPROCEDURE EVALUATION
Department of Anesthesiology  Postprocedure Note    Patient: Twyla Garcia  MRN: [id number]  YOB: 1960  Date of evaluation: 2/14/2025    Procedure Summary       Date: 02/14/25 Room / Location: [place] [address] / [facility] [place]    Anesthesia Start: 1416 Anesthesia Stop: 1621    Procedure: LEFT HIP OPEN ABDUCTOR REPAIR/BURSECTOMY (Left: Hip) Diagnosis:       Trochanteric bursitis of left hip      Rupture of tendon of hip abductor      (Trochanteric bursitis of left hip [M70.62])      (Rupture of tendon of hip abductor [S76.019A])    Surgeons: Jr Jolley MD Responsible Provider: Tre Hernández DO    Anesthesia Type: general, regional ASA Status: 2            Anesthesia Type: No value filed.    Jim Phase I: Jim Score: 9    Jim Phase II:      Vitals:    02/14/25 1707   BP: (!) 135/94   Pulse: 93   Resp: 16   Temp: 97.9 °F (36.6 °C)   SpO2: 94%       Anesthesia Post Evaluation    Patient location during evaluation: PACU  Patient participation: complete - patient participated  Level of consciousness: awake and awake and alert  Pain score: 4  Airway patency: patent  Nausea & Vomiting: no nausea and no vomiting  Cardiovascular status: hemodynamically stable  Respiratory status: acceptable  Hydration status: euvolemic  Pain management: adequate and satisfactory to patient    No notable events documented.

## 2025-02-14 NOTE — OP NOTE
Operative Note      Patient: Twyla Garcia  YOB: 1960  MRN: [id number]    Date of Procedure: 2/14/2025    Pre-Op Diagnosis Codes:      * Trochanteric bursitis of left hip [M70.62]     * Rupture of tendon of hip abductor [S76.019A]    Post-Op Diagnosis: Same       Procedure(s):  LEFT HIP OPEN GREATER TROCHANTERIC BURSECTOMY (54140),  LEFT  HIP OPEN    ABDUCTOR REPAIR (54958),     Surgeon(s):  Jr Jolley MD Almasri, Mahmoud, MD    Assistant:   Surgical Assistant: Janessa Canales  Fellow: Larry Payne MD  Physician Assistant: Kristen Conroy PA    Anesthesia: General + Orthomix    Estimated Blood Loss (mL): less than 100 cc    Complications: None    Specimens:   * No specimens in log *    Implants:  * No implants in log *      Drains: * No LDAs found *         Detailed Description of Procedure:     Findings: High grade partial tear >75% gluteus medius/minimus , no retraction    Clinical Note:  Twyla  is a 64-year-old who has been having lateral sided hip pain, muscle weakness and limp on the LEFT side for the past number of months.  The patient was ultimately diagnosed with refractory greater trochanteric pain syndrome and a a tear to her gluteus medius/minimus tear proven on an MRI of the patient's hip. The patient had failed extensive nonoperative treatment including physical therapy, activity modification and, anti-inflammatory medicine.  Despite that symptoms have persisted.  This allowed  Twyla to be a candidate for surgery in the form of LEFT hip open abductor repair.  Risks, benefits, advantages, disadvantages and potential complications as well as the anticipated postoperative course were discussed.  The patient agreed to pursue this treatment option.  All questions were addressed to their satisfaction.    Detailed Description of Procedure:   Twyla was met outside the operative theater.  Consent was signed and questions were answered and the LEFT side was confirmed as the correct  intact tendon repair on probing.  This was performed with the leg in approximately 10 deg of abduction.     IT Band Closure:   Closure of the IT band was carried out with a interrupted #1 Vicryl.  I was very careful to re-approximate the ITB in a lengthened fashion so as to avoid overightened ITB and any possibility of iatrogenic external snapping of the ITB overtop the Gt/abductor repair. Subcutaneous layer was closed using interrupted 2-0 Vicryl.  Staples were used to close the skin.     This concluded the   bursectomy,   double row abductor repair and ITB lengthening.    A 60cc orthopedic analgesic mixture was then infilitrated into the TFL/ITB and subcuntaenous tissue.     The wound was dressed sterilely with Therabond. waterproof dressing.The procedure was well-tolerated without any complications.  Estimated blood loss was less than 100cc    All needle and sponge counts matched the initial count per circulating and scrub personnel x2 prior to terminating this case.     The patient was then removed from the operative table, awakened and taken to recovery room in stable condition having tolerated the procedure well.  No immediate complications.      Kristen Conroy PA-C assisted me during this surgery. Due to the intricate and complex nature of this procedure,  Her services were required as a first assist with patient postioning , prepping, draping, retraction,  and a meticulous layered closure. The surgery could be not be executed without to skilled sets of hands.      Post-Operative Plan:  -Patient is Same Day Discharge  -Weight bearin%  flat foot touch WB with crutches x  4  weeks, then WBAT and off crutches/brace by 6-8 weeks    -Brace:  Abductor Hip Brace x  6 weeks, to be worn full time including with ambulation and sleep, and for comfort while seated  -PT: Early supervised PT for ROM and phase 1 hip rehabilitation protocol          PT and family member daily passive circumduction 3-6x/daily for 5

## 2025-02-17 ENCOUNTER — OFFICE VISIT (OUTPATIENT)
Dept: ORTHOPEDIC SURGERY | Age: 65
End: 2025-02-17

## 2025-02-17 VITALS — WEIGHT: 144 LBS | BODY MASS INDEX: 31.07 KG/M2 | HEIGHT: 57 IN | RESPIRATION RATE: 12 BRPM

## 2025-02-17 DIAGNOSIS — M16.12 PRIMARY OSTEOARTHRITIS OF LEFT HIP: ICD-10-CM

## 2025-02-17 DIAGNOSIS — Z98.890 STATUS POST ARTHROSCOPY OF HIP: Primary | ICD-10-CM

## 2025-02-17 DIAGNOSIS — M70.62 TROCHANTERIC BURSITIS OF LEFT HIP: ICD-10-CM

## 2025-02-17 DIAGNOSIS — S76.019A RUPTURE OF TENDON OF HIP ABDUCTOR: ICD-10-CM

## 2025-02-17 PROCEDURE — 99024 POSTOP FOLLOW-UP VISIT: CPT | Performed by: ORTHOPAEDIC SURGERY

## 2025-02-17 NOTE — PROGRESS NOTES
History of Present Illness:  Twyla Garcia is a pleasant 64 y.o. female who presents for a post operative visit. She is 3 days out following an open left hip abductor repair. Overall She is doing okay and feels that their pain is well controlled with current pain medications. She has been compliant with the 50% weight bearing instructions and hip brace. She plans to do physical therapy at [place].  She has been doing well postoperatively, but reports a fall onto the right shoulder over the weekend.  She has some right shoulder soreness, but reports retained function/range of motion.    She denies fevers, chills, numbness, tingling, and shortness of breath.    Medical History:  Patient's medications, allergies, past medical, surgical, social and family histories were reviewed and updated as appropriate.    No notes on file    Review of Systems  A 14 point review of systems was completed by the patient and is available in the media section of the scanned medical record and was reviewed on 2/17/2025.      Vital Signs:  Vitals:    02/17/25 1232   Resp: 12       General/Appearance: Alert and oriented and in no apparent distress.    Skin:  There are no skin lesions, cellulitis, or extreme edema. The patient has warm and well-perfused Bilateral lower  extremities with brisk capillary refill.      left Hip  Exam:     Inspection: The Therabond dressing clean/dry/intact and reinforced with Tegaderm. Mild ecchymosis and swelling are present as can be expected. There is no erythema, drainage or other signs of infection    Palpation:  No crepitus to gentle motion / circumduction of the hip    Active Range of Motion: Deferred    Strength: Able to perform gentle hip flexion with the toe remaining on the ground    Special Tests:  Deferred.    Neurovascular: Sensation to light touch is intact, no motor deficits, palpable radial pulses 2+    Radiology:     Plain radiographs of the Left hip comprising AP pelvis, AP, and Frog leg

## 2025-02-19 ENCOUNTER — HOSPITAL ENCOUNTER (OUTPATIENT)
Dept: PHYSICAL THERAPY | Age: 65
Setting detail: THERAPIES SERIES
End: 2025-02-19
Attending: ORTHOPAEDIC SURGERY
Payer: MEDICARE

## 2025-02-21 ENCOUNTER — HOSPITAL ENCOUNTER (OUTPATIENT)
Dept: PHYSICAL THERAPY | Age: 65
Setting detail: THERAPIES SERIES
Discharge: HOME | End: 2025-02-21
Attending: ORTHOPAEDIC SURGERY
Payer: MEDICARE

## 2025-02-21 PROCEDURE — 97140 MANUAL THERAPY 1/> REGIONS: CPT

## 2025-02-21 PROCEDURE — 97110 THERAPEUTIC EXERCISES: CPT

## 2025-02-21 PROCEDURE — 97530 THERAPEUTIC ACTIVITIES: CPT

## 2025-02-21 NOTE — PLAN OF CARE
[facility] [address], [place] [address] office: [phone] fax: [phone]      Physical Therapy Re-Certification Plan of Care    Dear  Dr. Jolley,    We had the pleasure of treating the following patient for physical therapy services at [facility].  A summary of our findings can be found in the updated assessment below.  This includes our plan of care.  If you have any questions or concerns regarding these findings, please do not hesitate to contact me at [phone].  Thank you for the referral.     Physician Signature:________________________________Date:__________________  By signing above (or electronic signature), therapist’s plan is approved by physician      Overall Response to Treatment:   []Patient is responding well to treatment and improvement is noted with regards  to goals   []Patient should continue to improve in reasonable time if they continue HEP   []Patient has plateaued and is no longer responding to skilled PT intervention    []Patient is getting worse and would benefit from return to referring MD   []Patient unable to adhere to initial POC   [x]Other: Presented today for pre-op visit for upcoming left hip surgery. ROM maintained from initial evaluation in December and she does have improvement in glute strength with her current HEP. We reviewed PO precautions and restrictions including limited weight bearing. Discussed use of AD (walker or crutches) and how to ambulate safely with TTWBing as well as navigate stairs safely. Will start PO PT 5 days after surgery and plan for 2x/week for 12 weeks.     Date range of Visits: 24-25  Total Visits: 3        Physical Therapy: TREATMENT/PROGRESS NOTE   Patient: Twyla Garcia (64 y.o. female)   Examination Date: 2025   :  1960 MRN: [id number]   Visit #: 3    NEEDS AUTH      Insurance Allowable Auth Needed   BMN [x]Yes    []No

## 2025-02-26 ENCOUNTER — HOSPITAL ENCOUNTER (OUTPATIENT)
Dept: PHYSICAL THERAPY | Age: 65
Setting detail: THERAPIES SERIES
Discharge: HOME | End: 2025-02-26
Attending: ORTHOPAEDIC SURGERY
Payer: MEDICARE

## 2025-02-26 PROCEDURE — 97110 THERAPEUTIC EXERCISES: CPT

## 2025-02-26 PROCEDURE — 97112 NEUROMUSCULAR REEDUCATION: CPT

## 2025-02-26 PROCEDURE — 97530 THERAPEUTIC ACTIVITIES: CPT

## 2025-02-27 ENCOUNTER — OFFICE VISIT (OUTPATIENT)
Dept: ORTHOPEDIC SURGERY | Age: 65
End: 2025-02-27

## 2025-02-27 VITALS — HEIGHT: 57 IN | WEIGHT: 144 LBS | BODY MASS INDEX: 31.07 KG/M2

## 2025-02-27 DIAGNOSIS — M70.62 TROCHANTERIC BURSITIS OF LEFT HIP: ICD-10-CM

## 2025-02-27 DIAGNOSIS — Z98.890 STATUS POST ARTHROSCOPY OF HIP: Primary | ICD-10-CM

## 2025-02-27 DIAGNOSIS — M16.12 PRIMARY OSTEOARTHRITIS OF LEFT HIP: ICD-10-CM

## 2025-02-27 DIAGNOSIS — S76.019A RUPTURE OF TENDON OF HIP ABDUCTOR: ICD-10-CM

## 2025-02-27 PROCEDURE — 99024 POSTOP FOLLOW-UP VISIT: CPT | Performed by: ORTHOPAEDIC SURGERY

## 2025-02-27 NOTE — PROGRESS NOTES
History of Present Illness:  Twyla Garcia is a pleasant 64 y.o. female who presents for a post operative visit. She is 2 weeks out following a left open abductor repair . Overall She is doing okay and feels that their pain is well controlled with current pain medications. She has been compliant with the  40lb flat foot weight bearing instructions and  hip  brace. She has been in physical therapy at  our offices.     She denies fevers, chills, numbness, tingling, and shortness of breath.    Medical History:  Patient's medications, allergies, past medical, surgical, social and family histories were reviewed and updated as appropriate.    No notes on file    Review of Systems  A 14 point review of systems was completed by the patient and is available in the media section of the scanned medical record and was reviewed on 2/27/2025.      Vital Signs:  There were no vitals filed for this visit.    General/Appearance: Alert and oriented and in no apparent distress.    Skin:  There are no skin lesions, cellulitis, or extreme edema. The patient has warm and well-perfused Bilateral lower  extremities with brisk capillary refill.      Hip  Exam:    Inspection: The dermabond was removal and the Hip incision(s)   are clean, dry and intact and well approximated.  Mild ecchymosis and swelling are present as can be expected. There is no erythema, drainage or other signs of infection    Palpation:  No crepitus to gentle motion / circumduction of the hip    Active Range of Motion: Deferred    Passive Range of Motion: 0-90deg v limber.    Strength:  Deferred    Special Tests:  Deferred.    Neurovascular: Sensation to light touch is intact, no motor deficits, palpable radial pulses 2+    Radiology:          No new XR obtained at this time.          Assessment :  Ms. Twyla Garcia is a pleasant 64 y.o. patient who is doing great 2 weeks post open abductor repair, left hip.        Impression:  Encounter Diagnoses   Name Primary?

## 2025-03-03 ENCOUNTER — HOSPITAL ENCOUNTER (OUTPATIENT)
Dept: PHYSICAL THERAPY | Age: 65
Setting detail: THERAPIES SERIES
Discharge: HOME | End: 2025-03-03
Attending: ORTHOPAEDIC SURGERY
Payer: MEDICARE

## 2025-03-03 PROCEDURE — 97530 THERAPEUTIC ACTIVITIES: CPT

## 2025-03-03 PROCEDURE — 97112 NEUROMUSCULAR REEDUCATION: CPT

## 2025-03-03 PROCEDURE — 97110 THERAPEUTIC EXERCISES: CPT

## 2025-03-03 NOTE — FLOWSHEET NOTE
45' 3       Total Treatment Minutes 55'        Charge Justification:  (63264) THERAPEUTIC EXERCISE - Provided verbal/tactile cueing for activities related to strengthening, flexibility, endurance, ROM performed to prevent loss of range of motion, maintain or improve muscular strength or increase flexibility, following either an injury or surgery.   (54558) HOME EXERCISE PROGRAM - Reviewed/Progressed HEP activities related to strengthening, flexibility, endurance, ROM performed to prevent loss of range of motion, maintain or improve muscular strength or increase flexibility, following either an injury or surgery.  (12333) NEUROMUSCULAR RE-EDUCATION - Therapeutic procedure, 1 or more areas, each 15 minutes; neuromuscular reeducation of movement, balance, coordination, kinesthetic sense, posture, and/or proprioception for sitting and/or standing activities  (34878) HOME EXERCISE PROGRAM - Reviewed/Progressed HEP activities related to neuromuscular reeducation of movement, balance, coordination, kinesthetic sense, posture, and/or proprioception for sitting and/or standing activities    (48136) THERAPEUTIC ACTIVITY - use of dynamic activities to improve functional performance. (Ex include squatting, ascending/descending stairs, walking, bending, lifting, catching, throwing, pushing, pulling, jumping.)  Direct, one on one contact, billed in 15-minute increments.  (24985) MANUAL THERAPY -  Manual therapy techniques, 1 or more regions, each 15 minutes (Mobilization/manipulation, manual lymphatic drainage, manual traction) for the purpose of modulating pain, promoting relaxation,  increasing ROM, reducing/eliminating soft tissue swelling/inflammation/restriction, improving soft tissue extensibility and allowing for proper ROM for normal function with self care, mobility, lifting and ambulation      GOALS     GOALS:  Patient stated goal: Decrease pain in hip  [] Progressing: [] Met: [] Not Met: [] Adjusted    Therapist goals

## 2025-03-05 ENCOUNTER — APPOINTMENT (OUTPATIENT)
Dept: PHYSICAL THERAPY | Age: 65
End: 2025-03-05
Attending: ORTHOPAEDIC SURGERY
Payer: MEDICARE

## 2025-03-06 ENCOUNTER — HOSPITAL ENCOUNTER (OUTPATIENT)
Dept: PHYSICAL THERAPY | Age: 65
Setting detail: THERAPIES SERIES
Discharge: HOME | End: 2025-03-06
Attending: ORTHOPAEDIC SURGERY
Payer: MEDICARE

## 2025-03-06 PROCEDURE — 97530 THERAPEUTIC ACTIVITIES: CPT

## 2025-03-06 PROCEDURE — 97110 THERAPEUTIC EXERCISES: CPT

## 2025-03-06 PROCEDURE — 97112 NEUROMUSCULAR REEDUCATION: CPT

## 2025-03-06 NOTE — PLAN OF CARE
Deficit   WOMAC 12/30/24 39%   iHOT 2/6/25 pre-op 81%   iHOT 2/21/25 PO 79% (25/120)   iHOT 3/6/25 67% (40/120)       Pain: 0/10    Patient stated complaint: 3 weeks PO. Doing very well. Mild fatigue, soreness last visit. Resolved by next morning. Denies pain. Leaves today for [place] for 1 month.       OBJECTIVE EXAMINATION     3/6/25 deferred   Flexibility L R Comment   Hamstring   90/90   Gastroc      ITB   Jacob's   Quad   Ely's   Hip Flexor   Lucien     3/6/25  ROM PROM AROM Comment    L R L R    Hip Flexion   122     Hip Extension        Hip IR 28       Hip ER 42                 3/6/25 deferred  Strength L R Comment   Quad    Hamstring    Hip  flexion    Hip abd      Hip Ext      Hip IR    Hip ER      3/6/25 deferred  Special Test Results/Comment   TEO Kohler            Reflexes/Sensation: N/A   []Dermatomes/Myotomes intact    []Reflexes equal and normal bilaterally   []Other:    Joint mobility: 3/6/25 deferred    []Normal    []Hypo   []Hyper    Palpation:  mild TTP lateral hip muscles more than GT 3/6/25    Functional Mobility/Transfers: Ind 3/6/25    Bandages/Dressings/Incisions: healing well 3/6/25    Gait: (include devices/WB status)  WBAT with crutches and brace 3/6/25    Functional Test:  Test Deferred  3/6/25    TUG Test     30 sec sit to stand                Exercises/Interventions     Restrictions/Precautions: Left hip OA; s/p left hip glute repair/ bursectomy on 2/14/25  Exercises/Interventions:  Exercise/Equipment Resistance/Repetitions Other comments   Stretching       Hamstring 3x30\"    Hip Flexor     ITB     Piriformis     Hip Flexor 3x30\" Mod lucien off side of bed; light   Quad     Inclined Calf 3x30\"            Idris 6' ROM / light warm up              ROM     Quadruped pelvic tilts 2x10    Quadruped rocks     Hook lying rotaiton 10x10\"ea Light - stop at any tightness sensation   Single knee to chest 5x10\"    Long sitting IR/ER             Isometrics     TA Iso + PPT 10x10\"

## 2025-05-08 ENCOUNTER — OFFICE VISIT (OUTPATIENT)
Dept: ORTHOPEDIC SURGERY | Age: 65
End: 2025-05-08

## 2025-05-08 VITALS — BODY MASS INDEX: 28.34 KG/M2 | HEIGHT: 58 IN | WEIGHT: 135 LBS

## 2025-05-08 DIAGNOSIS — M16.12 PRIMARY OSTEOARTHRITIS OF LEFT HIP: ICD-10-CM

## 2025-05-08 DIAGNOSIS — Z98.890 STATUS POST ARTHROSCOPY OF HIP: Primary | ICD-10-CM

## 2025-05-08 DIAGNOSIS — M70.62 TROCHANTERIC BURSITIS OF LEFT HIP: ICD-10-CM

## 2025-05-08 DIAGNOSIS — S76.019A RUPTURE OF TENDON OF HIP ABDUCTOR: ICD-10-CM

## 2025-05-08 PROCEDURE — 99024 POSTOP FOLLOW-UP VISIT: CPT | Performed by: ORTHOPAEDIC SURGERY

## 2025-05-09 NOTE — PROGRESS NOTES
History of Present Illness:  Twyla Garcia is a pleasant 65 y.o. female who presents for a post operative visit. She is 3 months out following a left open abductor repair . Overall She is doing great with no setbacks. Discharged from PT.     She denies fevers, chills, numbness, tingling, and shortness of breath.    Medical History:  Patient's medications, allergies, past medical, surgical, social and family histories were reviewed and updated as appropriate.    No notes on file    Review of Systems  A 14 point review of systems was completed by the patient and is available in the media section of the scanned medical record and was reviewed on 5/9/2025.      Vital Signs:  There were no vitals filed for this visit.    General/Appearance: Alert and oriented and in no apparent distress.    Skin:  There are no skin lesions, cellulitis, or extreme edema. The patient has warm and well-perfused Bilateral lower  extremities with brisk capillary refill.      Hip  Exam:    Inspection:  Hip incision(s)   are HEALED. . There is no erythema, drainage or other signs of infection    Palpation:  No crepitus to gentle motion / circumduction of the hip    Active Range of Motion: 0/100/15/35    Passive Range of Motion: same v limber    Strength:  4/5 abductor side lying    Special Tests:  no rotational pain     Neurovascular: Sensation to light touch is intact, no motor deficits, palpable radial pulses 2+    Radiology:          No new XR obtained at this time.          Assessment :  Ms. Twyla Garcia is a pleasant 65 y.o. patient who is doing great 12 weeks post open abductor repair, left hip.        Impression:  Encounter Diagnoses   Name Primary?    Status post arthroscopy of hip Yes    Rupture of tendon of hip abductor     Trochanteric bursitis of left hip     Primary osteoarthritis of left hip        Office Procedures:  No orders of the defined types were placed in this encounter.      Treatment Plan:    Overall Twyla Garcia

## 2025-07-03 ENCOUNTER — OFFICE VISIT (OUTPATIENT)
Dept: ORTHOPEDIC SURGERY | Age: 65
End: 2025-07-03
Payer: MEDICARE

## 2025-07-03 VITALS — HEIGHT: 58 IN | WEIGHT: 135 LBS | RESPIRATION RATE: 12 BRPM | BODY MASS INDEX: 28.34 KG/M2

## 2025-07-03 DIAGNOSIS — M70.62 TROCHANTERIC BURSITIS OF LEFT HIP: ICD-10-CM

## 2025-07-03 DIAGNOSIS — M16.12 PRIMARY OSTEOARTHRITIS OF LEFT HIP: ICD-10-CM

## 2025-07-03 DIAGNOSIS — Z98.890 STATUS POST ARTHROSCOPY OF HIP: Primary | ICD-10-CM

## 2025-07-03 DIAGNOSIS — S76.019A RUPTURE OF TENDON OF HIP ABDUCTOR: ICD-10-CM

## 2025-07-03 PROCEDURE — G8427 DOCREV CUR MEDS BY ELIG CLIN: HCPCS

## 2025-07-03 PROCEDURE — 99214 OFFICE O/P EST MOD 30 MIN: CPT

## 2025-07-03 PROCEDURE — G8417 CALC BMI ABV UP PARAM F/U: HCPCS

## 2025-07-03 PROCEDURE — 1036F TOBACCO NON-USER: CPT

## 2025-07-03 PROCEDURE — 3017F COLORECTAL CA SCREEN DOC REV: CPT

## 2025-07-03 RX ORDER — MELOXICAM 15 MG/1
15 TABLET ORAL DAILY
Qty: 30 TABLET | Refills: 3 | Status: SHIPPED | OUTPATIENT
Start: 2025-07-03

## 2025-07-03 NOTE — PROGRESS NOTES
History of Present Illness:  Twyla Garcia is a pleasant 65 y.o. female who presents for a post operative visit. She is 4 months out following a left open abductor repair .  Overall she was doing very well after the surgery.  Unfortunately she had 2-3 falls over the past month.  Last of which was around 3 weeks ago when she fell backwards and landed on her left hip.  She has since then noticed worsening pain along her left side.  This is similar to the pain that she had prior to the surgery.  It is not as bad as it was prior to the surgery however continues to bother her on a daily basis.    She denies fevers, chills, numbness, tingling, and shortness of breath.    Medical History:  Patient's medications, allergies, past medical, surgical, social and family histories were reviewed and updated as appropriate.    No notes on file    Review of Systems  A 14 point review of systems was completed by the patient and is available in the media section of the scanned medical record and was reviewed on 7/3/2025.      Vital Signs:  Vitals:    07/03/25 1225   Resp: 12       General/Appearance: Alert and oriented and in no apparent distress.    Skin:  There are no skin lesions, cellulitis, or extreme edema. The patient has warm and well-perfused Bilateral lower  extremities with brisk capillary refill.      Hip  Exam:    Inspection:  Hip incision(s)   are HEALED. . There is no erythema, drainage or other signs of infection    Palpation: Tenderness along the greater trochanter.    Active Range of Motion: 0/100/15/35    Passive Range of Motion: same v limber    Strength:  4/5 abductor side lying with pain    Special Tests:  no rotational pain     Neurovascular: Sensation to light touch is intact, no motor deficits, palpable radial pulses 2+    Radiology:          No new XR obtained at this time.          Assessment :  Ms. Twyla Garcia is a pleasant 65 y.o. patient who is doing great 4 months post open abductor repair, left

## 2025-08-14 ENCOUNTER — OFFICE VISIT (OUTPATIENT)
Dept: ORTHOPEDIC SURGERY | Age: 65
End: 2025-08-14

## 2025-08-14 VITALS — WEIGHT: 135 LBS | HEIGHT: 58 IN | BODY MASS INDEX: 28.34 KG/M2 | RESPIRATION RATE: 12 BRPM

## 2025-08-14 DIAGNOSIS — M70.62 TROCHANTERIC BURSITIS OF LEFT HIP: ICD-10-CM

## 2025-08-14 DIAGNOSIS — Z98.890 STATUS POST ARTHROSCOPY OF HIP: Primary | ICD-10-CM

## 2025-08-14 DIAGNOSIS — M16.12 PRIMARY OSTEOARTHRITIS OF LEFT HIP: ICD-10-CM

## 2025-08-14 DIAGNOSIS — S76.019A RUPTURE OF TENDON OF HIP ABDUCTOR: ICD-10-CM

## 2025-08-14 RX ORDER — DICLOFENAC EPOLAMINE 0.01 G/1
1 SYSTEM TOPICAL DAILY
Qty: 30 PATCH | Refills: 0 | Status: SHIPPED | OUTPATIENT
Start: 2025-08-14 | End: 2025-09-13

## 2025-08-27 ENCOUNTER — HOSPITAL ENCOUNTER (OUTPATIENT)
Dept: PHYSICAL THERAPY | Age: 65
Setting detail: THERAPIES SERIES
Discharge: HOME | End: 2025-08-27
Attending: ORTHOPAEDIC SURGERY
Payer: MEDICARE

## 2025-08-27 PROCEDURE — 97110 THERAPEUTIC EXERCISES: CPT

## 2025-08-27 PROCEDURE — 97161 PT EVAL LOW COMPLEX 20 MIN: CPT

## 2025-08-27 PROCEDURE — 97112 NEUROMUSCULAR REEDUCATION: CPT

## 2025-09-04 ENCOUNTER — HOSPITAL ENCOUNTER (OUTPATIENT)
Dept: PHYSICAL THERAPY | Age: 65
Setting detail: THERAPIES SERIES
Discharge: HOME | End: 2025-09-04
Attending: ORTHOPAEDIC SURGERY
Payer: MEDICARE

## 2025-09-04 PROCEDURE — 97112 NEUROMUSCULAR REEDUCATION: CPT

## 2025-09-04 PROCEDURE — 97110 THERAPEUTIC EXERCISES: CPT

## (undated) DEVICE — GLOVE ORTHO 7   MSG9470

## (undated) DEVICE — AVANOS* TUOHY EPIDURAL NEEDLE: Brand: AVANOS

## (undated) DEVICE — MARKER SKIN MINI PUR FINE REGULAR TIP W RUL XL PREP RESIST

## (undated) DEVICE — SOLUTION SURG PREP 26 CC PURPREP

## (undated) DEVICE — EPIDURAL TRAY: Brand: MEDLINE INDUSTRIES, INC.

## (undated) DEVICE — 4.75MM ALPHAVENT AND OMEGA AWL: Brand: ALPHAVENT, OMEGA

## (undated) DEVICE — NEEDLE SPNL L3.5IN PNK HUB S STL REG WALL FIT STYL W/ QNCKE

## (undated) DEVICE — SUTURE MONOCRYL + SZ 4-0 L27IN ABSRB UD L19MM PS-2 3/8 CIR MCP426H

## (undated) DEVICE — 3M™ COBAN™ NL STERILE NON-LATEX SELF-ADHERENT WRAP, 2086S, 6 IN X 5 YD (15 CM X 4,5 M), 12 ROLLS/CASE: Brand: 3M™ COBAN™

## (undated) DEVICE — TOWEL,STOP FLAG GOLD N-W: Brand: MEDLINE

## (undated) DEVICE — GLOVE SURG SZ 85 L12IN THK75MIL DK GRN LTX FREE

## (undated) DEVICE — SOLUTION WND IRRIGATION 450 ML 0.5 PVP-I 0.9 NACL

## (undated) DEVICE — NERVE BLOCK TRAY: Brand: MEDLINE INDUSTRIES, INC.

## (undated) DEVICE — NEEDLE QUINCKE 22GX5": Brand: MEDLINE

## (undated) DEVICE — SOL IRR SOD CHL 0.9% TITAN XL CNTNR 3000ML

## (undated) DEVICE — SHEET, ORTHO, SPLIT, STERILE: Brand: MEDLINE

## (undated) DEVICE — HOLDER RESTRAINT LIMB UNIV FOAM PR D RNG SINGLE STRP LF

## (undated) DEVICE — BLANKET WRM W29.9XL79.1IN UP BODY FORC AIR MISTRAL-AIR

## (undated) DEVICE — 4.75MM ALPHAVENT TAP: Brand: ALPHAVENT

## (undated) DEVICE — NEEDLE SPINAL 22GA L3.5IN SPINOCAN

## (undated) DEVICE — COVER LT HNDL BLU PLAS

## (undated) DEVICE — GLOVE SURG SZ 65 L12IN FNGR THK79MIL GRN LTX FREE

## (undated) DEVICE — CUFF RESTRN WRST OR ANK 45FT AD FOAM

## (undated) DEVICE — STERILE PVP: Brand: MEDLINE INDUSTRIES, INC.

## (undated) DEVICE — PROTECTOR ULN NRV PUR FOAM HK LOOP STRP ANATOMICALLY

## (undated) DEVICE — GLOVE SURG SZ 6 THK91MIL LTX FREE SYN POLYISOPRENE ANTI

## (undated) DEVICE — 3M™ TEGADERM™ TRANSPARENT FILM DRESSING FRAME STYLE, 1627, 4 IN X 10 IN (10 CM X 25 CM), 20/CT 4CT/CASE: Brand: 3M™ TEGADERM™

## (undated) DEVICE — TOTAL HIP: Brand: MEDLINE INDUSTRIES, INC.

## (undated) DEVICE — NEEDLE HYPO 30GA L0.5IN BGE POLYPR HUB S STL REG BVL STR

## (undated) DEVICE — YANKAUER,OPEN TIP,W/O VENT,STERILE: Brand: MEDLINE INDUSTRIES, INC.

## (undated) DEVICE — 3M™ STERI-DRAPE™ U-DRAPE 1015: Brand: STERI-DRAPE™

## (undated) DEVICE — GLOVE SURG SZ 7 L12IN THK7.5MIL DK GRN LTX FREE MSG6570] MEDLINE INDUSTRIES INC]

## (undated) DEVICE — 3M™ STERI-DRAPE™ INSTRUMENT POUCH 1018: Brand: STERI-DRAPE™

## (undated) DEVICE — SYRINGE MED 5ML STD CLR PLAS LUERLOCK TIP N CTRL DISP

## (undated) DEVICE — GOWN,SIRUS,POLYRNF,BRTHSLV,XL,30/CS: Brand: MEDLINE

## (undated) DEVICE — 3D CONTACT DRESSING 1.5IN X 10IN: Brand: THERABOND 3D ANTIMICROBIAL BARRIER SYSTEMS

## (undated) DEVICE — UNDERPANTS INCONT XL 45-70IN KNIT SEAMLESS DSGN COLOR-CODED

## (undated) DEVICE — COVER,MAYO STAND,XL,STERILE: Brand: MEDLINE

## (undated) DEVICE — ENT I-LF: Brand: MEDLINE INDUSTRIES, INC.

## (undated) DEVICE — GLOVE SURG SZ 7 L12IN FNGR THK79MIL GRN LTX FREE

## (undated) DEVICE — SUTURE VICRYL + SZ 1 L18IN ABSRB UD L36MM CT-1 1/2 CIR VCP841D

## (undated) DEVICE — NEPTUNE E-SEP SMOKE EVACUATION PENCIL, COATED, 70MM BLADE, PUSH BUTTON SWITCH: Brand: NEPTUNE E-SEP

## (undated) DEVICE — CORD ES L12FT BPLR FRCP

## (undated) DEVICE — SUTURE ABSORBABLE MONOFILAMENT 6-0 G-1 18 IN PLN GUT 770G

## (undated) DEVICE — UNDERGLOVE SURG SZ 8 BLU LTX FREE SYN POLYISOPRENE POLYMER

## (undated) DEVICE — TRAP FLUID

## (undated) DEVICE — GLOVE SURG SZ 65 CRM LTX FREE POLYISOPRENE POLYMER BEAD ANTI

## (undated) DEVICE — Z DISCONTINUED USE 2131664 WIPE INSTR W3XL3IN NONLINTING

## (undated) DEVICE — INSTRUMENT COVER: Brand: CONVERTORS

## (undated) DEVICE — GLOVE SURG SZ 6 L12IN FNGR THK79MIL GRN LTX FREE

## (undated) DEVICE — SOLUTION IV IRRIG 250ML ST LF 0.9% SODIUM 2F7122

## (undated) DEVICE — SPONGE GZ W4XL4IN COT 12 PLY TYP VII WVN C FLD DSGN

## (undated) DEVICE — INTENDED FOR TISSUE SEPARATION, AND OTHER PROCEDURES THAT REQUIRE A SHARP SURGICAL BLADE TO PUNCTURE OR CUT.: Brand: BARD-PARKER ® STAINLESS STEEL BLADES

## (undated) DEVICE — TOWEL,OR,DSP,ST,BLUE,DLX,8/PK,10PK/CS: Brand: MEDLINE

## (undated) DEVICE — APPLICATOR,COTTON-TIP,WOOD,3,STRL: Brand: MEDLINE

## (undated) DEVICE — MAT FLR W32XL58IN